# Patient Record
Sex: FEMALE | Race: WHITE | NOT HISPANIC OR LATINO | Employment: UNEMPLOYED | URBAN - METROPOLITAN AREA
[De-identification: names, ages, dates, MRNs, and addresses within clinical notes are randomized per-mention and may not be internally consistent; named-entity substitution may affect disease eponyms.]

---

## 2021-05-26 ENCOUNTER — OFFICE VISIT (OUTPATIENT)
Dept: URGENT CARE | Facility: CLINIC | Age: 21
End: 2021-05-26
Payer: COMMERCIAL

## 2021-05-26 VITALS
HEART RATE: 140 BPM | DIASTOLIC BLOOD PRESSURE: 78 MMHG | TEMPERATURE: 100.5 F | OXYGEN SATURATION: 98 % | SYSTOLIC BLOOD PRESSURE: 122 MMHG | RESPIRATION RATE: 16 BRPM

## 2021-05-26 DIAGNOSIS — R82.90 ABNORMAL URINALYSIS: ICD-10-CM

## 2021-05-26 DIAGNOSIS — R50.9 FEVER, UNSPECIFIED: Primary | ICD-10-CM

## 2021-05-26 DIAGNOSIS — R45.1 AGITATION: ICD-10-CM

## 2021-05-26 LAB
SL AMB  POCT GLUCOSE, UA: NEGATIVE
SL AMB LEUKOCYTE ESTERASE,UA: ABNORMAL
SL AMB POCT BILIRUBIN,UA: NEGATIVE
SL AMB POCT BLOOD,UA: ABNORMAL
SL AMB POCT CLARITY,UA: CLEAR
SL AMB POCT COLOR,UA: YELLOW
SL AMB POCT KETONES,UA: NEGATIVE
SL AMB POCT NITRITE,UA: NEGATIVE
SL AMB POCT PH,UA: 6.5
SL AMB POCT SPECIFIC GRAVITY,UA: 1.01
SL AMB POCT URINE PROTEIN: NEGATIVE
SL AMB POCT UROBILINOGEN: 0.2

## 2021-05-26 PROCEDURE — 99203 OFFICE O/P NEW LOW 30 MIN: CPT | Performed by: PHYSICIAN ASSISTANT

## 2021-05-26 PROCEDURE — 81002 URINALYSIS NONAUTO W/O SCOPE: CPT | Performed by: PHYSICIAN ASSISTANT

## 2021-05-26 PROCEDURE — 87086 URINE CULTURE/COLONY COUNT: CPT | Performed by: PHYSICIAN ASSISTANT

## 2021-05-26 RX ORDER — RISPERIDONE 0.5 MG/1
0.75 TABLET, FILM COATED ORAL DAILY
COMMUNITY
Start: 2021-05-17 | End: 2021-06-14 | Stop reason: ALTCHOICE

## 2021-05-26 RX ORDER — RISPERIDONE 1 MG/1
1 TABLET, FILM COATED ORAL 2 TIMES DAILY
COMMUNITY
Start: 2021-05-17 | End: 2022-04-05 | Stop reason: SDUPTHER

## 2021-05-26 RX ORDER — CIPROFLOXACIN 500 MG/1
500 TABLET, FILM COATED ORAL EVERY 12 HOURS SCHEDULED
Qty: 6 TABLET | Refills: 0 | Status: SHIPPED | OUTPATIENT
Start: 2021-05-26 | End: 2021-05-29

## 2021-05-26 NOTE — PATIENT INSTRUCTIONS
Fever in Adults   WHAT YOU NEED TO KNOW:   A fever is an increase in your body temperature  Normal body temperature is 98 6°F (37°C)  Fever is generally defined as greater than 100 4°F (38°C)  Common causes include an infection, injury, or disease such as arthritis  DISCHARGE INSTRUCTIONS:   Return to the emergency department if:   · Your fever does not go away or gets worse even after treatment  · You have a stiff neck and a bad headache  · You are confused  You may not be able to think clearly or remember things like you normally do  · Your heart beats faster than usual even after treatment  · You have shortness of breath or chest pain when you breathe  · You urinate small amounts or not at all  · Your skin, lips, or nails turn blue  Contact your healthcare provider if:   · You have abdominal pain or you feel bloated  · You have nausea or are vomiting  · You have pain or burning when you urinate, or you have pain in your back  · You have questions or concerns about your condition or care  Medicines: You may need any of the following:  · NSAIDs , such as ibuprofen, help decrease swelling, pain, and fever  This medicine is available with or without a doctor's order  NSAIDs can cause stomach bleeding or kidney problems in certain people  If you take blood thinner medicine, always ask if NSAIDs are safe for you  Always read the medicine label and follow directions  Do not give these medicines to children under 10months of age without direction from your child's healthcare provider  · Acetaminophen  decreases pain and fever  It is available without a doctor's order  Ask how much to take and how often to take it  Follow directions  Read the labels of all other medicines you are using to see if they also contain acetaminophen, or ask your doctor or pharmacist  Acetaminophen can cause liver damage if not taken correctly   Do not use more than 4 grams (4,000 milligrams) total of acetaminophen in one day  · Antibiotics  may be given if you have an infection caused by bacteria  · Take your medicine as directed  Contact your healthcare provider if you think your medicine is not helping or if you have side effects  Tell him of her if you are allergic to any medicine  Keep a list of the medicines, vitamins, and herbs you take  Include the amounts, and when and why you take them  Bring the list or the pill bottles to follow-up visits  Carry your medicine list with you in case of an emergency  Follow up with your healthcare provider as directed:  Write down your questions so you remember to ask them during your visits  Self-care:   · Drink more liquids as directed  A fever makes you sweat  This can increase your risk for dehydration  Liquids can help prevent dehydration  ? Drink at least 6 to 8 eight-ounce cups of clear liquids each day  Drink water, juice, or broth  Do not drink sports drinks  They may contain caffeine  ? Ask your healthcare provider if you should drink an oral rehydration solution (ORS)  An ORS has the right amounts of water, salts, and sugar you need to replace body fluids  · Dress in lightweight clothes  Shivers may be a sign that your fever is rising  Do not put extra blankets or clothes on  This may cause your fever to rise even higher  Dress in light, comfortable clothing  Use a lightweight blanket or sheet when you sleep  Change your clothes, blanket, or sheets if they get wet  · Cool yourself safely  Take a bath in cool or lukewarm water  Use an ice pack wrapped in a small towel or wet a washcloth with cool water  Place the ice pack or wet washcloth on your forehead or the back of your neck  © Copyright [address] Information is for End User's use only and may not be sold, redistributed or otherwise used for commercial purposes   All illustrations and images included in CareNotes® are the copyrighted property of [employer]  or [address]  The above information is an  only  It is not intended as medical advice for individual conditions or treatments  Talk to your doctor, nurse or pharmacist before following any medical regimen to see if it is safe and effective for you

## 2021-05-26 NOTE — PROGRESS NOTES
[facility]        NAME: Cecelia Atkinson is a 19 y o  female  : 2000    MRN: [id number]  DATE:   May 26, 2021  TIME: 3:24 PM    Assessment and Plan   Fever, unspecified [R50 9]  1  Fever, unspecified  POCT urine dip    Urine culture    ciprofloxacin (CIPRO) 500 mg tablet   2  Agitation     3  Abnormal urinalysis  ciprofloxacin (CIPRO) 500 mg tablet         Patient Instructions      discussed condition with patient's mother  The patient is autistic so she is unable to provide a history  She has recent onset of fever and agitation/change in behavior and this tends to be associated with UTI  She has microscopic hematuria and leukocytes in the urine  Will start her on an oral antibiotic and send sample for culture to further evaluate  Recommended rest, increase hydration, close observation  Should be re-evaluated if condition persists or worsens  Follow up with PCP in 3-5 days  Proceed to  ER if symptoms worsen  Chief Complaint     Chief Complaint   Patient presents with    Agitation     Pt's mother reports she is pacing/unsettled x2 days  History of Present Illness         Patient presents with 2 day history of what her mother reports is fever as well as increased agitation/ change in behavior  The patient is autistic so she is unable to vocalize her symptoms  In the past when the patient has behave in this manner, it has often been associated with UTI  She is unsure whether not the patient is having any abdominal pain  Review of Systems   Review of Systems   Constitutional: Positive for fever  Respiratory: Negative  Cardiovascular: Negative  Gastrointestinal: Negative  Genitourinary: Negative  Psychiatric/Behavioral: Positive for agitation           Current Medications       Current Outpatient Medications:     risperiDONE (RisperDAL) 1 mg tablet, Take 1 mg by mouth 2 (two) times a day, Disp: , Rfl:     risperiDONE (RisperDAL) 0 5 mg tablet, Take 0 75 mg by mouth daily, Disp: , Rfl:     Current Allergies     Allergies as of 05/26/2021    (No Known Allergies)            The following portions of the patient's history were reviewed and updated as appropriate: allergies, current medications, past family history, past medical history, past social history, past surgical history and problem list      Past Medical History:   Diagnosis Date    Autism     PCOS (polycystic ovarian syndrome)        History reviewed  No pertinent surgical history  History reviewed  No pertinent family history  Medications have been verified  Objective   /78   Pulse (!) 140   Temp 100 5 °F (38 1 °C)   Resp 16   SpO2 98%   No LMP recorded  Physical Exam     Physical Exam  Vitals signs reviewed  Constitutional:       General: She is not in acute distress  Appearance: She is well-developed  HENT:      Mouth/Throat:      Mouth: Mucous membranes are moist       Pharynx: Oropharynx is clear  Neck:      Musculoskeletal: Neck supple  Cardiovascular:      Rate and Rhythm: Normal rate and regular rhythm  Pulses: Normal pulses  Heart sounds: Normal heart sounds  No murmur  Pulmonary:      Effort: Pulmonary effort is normal  No respiratory distress  Breath sounds: Normal breath sounds  Abdominal:      General: Bowel sounds are normal  There is no distension  Palpations: Abdomen is soft  Tenderness: There is no abdominal tenderness  There is no right CVA tenderness or left CVA tenderness  Lymphadenopathy:      Cervical: No cervical adenopathy  Neurological:      Mental Status: She is alert and oriented to person, place, and time

## 2021-05-28 LAB — BACTERIA UR CULT: ABNORMAL

## 2021-06-14 ENCOUNTER — OFFICE VISIT (OUTPATIENT)
Dept: FAMILY MEDICINE CLINIC | Facility: CLINIC | Age: 21
End: 2021-06-14
Payer: COMMERCIAL

## 2021-06-14 VITALS
WEIGHT: 202.8 LBS | OXYGEN SATURATION: 98 % | TEMPERATURE: 98.5 F | HEIGHT: 62 IN | DIASTOLIC BLOOD PRESSURE: 76 MMHG | HEART RATE: 117 BPM | SYSTOLIC BLOOD PRESSURE: 116 MMHG | RESPIRATION RATE: 16 BRPM | BODY MASS INDEX: 37.32 KG/M2

## 2021-06-14 DIAGNOSIS — Z00.00 ANNUAL PHYSICAL EXAM: Primary | ICD-10-CM

## 2021-06-14 DIAGNOSIS — N39.0 CHRONIC UTI: ICD-10-CM

## 2021-06-14 DIAGNOSIS — F84.0 AUTISTIC DISORDER: ICD-10-CM

## 2021-06-14 DIAGNOSIS — H66.002 NON-RECURRENT ACUTE SUPPURATIVE OTITIS MEDIA OF LEFT EAR WITHOUT SPONTANEOUS RUPTURE OF TYMPANIC MEMBRANE: ICD-10-CM

## 2021-06-14 DIAGNOSIS — E28.2 PCOS (POLYCYSTIC OVARIAN SYNDROME): ICD-10-CM

## 2021-06-14 LAB
SL AMB  POCT GLUCOSE, UA: NEGATIVE
SL AMB LEUKOCYTE ESTERASE,UA: ABNORMAL
SL AMB POCT BILIRUBIN,UA: NEGATIVE
SL AMB POCT BLOOD,UA: ABNORMAL
SL AMB POCT CLARITY,UA: CLEAR
SL AMB POCT COLOR,UA: YELLOW
SL AMB POCT KETONES,UA: NEGATIVE
SL AMB POCT NITRITE,UA: NEGATIVE
SL AMB POCT PH,UA: 6
SL AMB POCT SPECIFIC GRAVITY,UA: 1.01
SL AMB POCT URINE PROTEIN: NEGATIVE
SL AMB POCT UROBILINOGEN: 0.2

## 2021-06-14 PROCEDURE — 3008F BODY MASS INDEX DOCD: CPT | Performed by: NURSE PRACTITIONER

## 2021-06-14 PROCEDURE — 1036F TOBACCO NON-USER: CPT | Performed by: NURSE PRACTITIONER

## 2021-06-14 PROCEDURE — 81002 URINALYSIS NONAUTO W/O SCOPE: CPT | Performed by: NURSE PRACTITIONER

## 2021-06-14 PROCEDURE — 99385 PREV VISIT NEW AGE 18-39: CPT | Performed by: NURSE PRACTITIONER

## 2021-06-14 RX ORDER — AMOXICILLIN 875 MG/1
875 TABLET, COATED ORAL 2 TIMES DAILY
Qty: 14 TABLET | Refills: 0 | Status: SHIPPED | OUTPATIENT
Start: 2021-06-14 | End: 2021-06-21

## 2021-06-14 NOTE — PROGRESS NOTES
[facility]    NAME: Oralia Terrazas  AGE: 19 y o  SEX: female  : 2000     DATE: 2021     Assessment and Plan:     Problem List Items Addressed This Visit        Other    Autistic disorder      Other Visit Diagnoses     Annual physical exam    -  Primary    PCOS (polycystic ovarian syndrome)        Relevant Orders    TSH, 3rd generation    T4, free    T3, free    Chronic UTI        Relevant Medications    amoxicillin (AMOXIL) 875 mg tablet    Other Relevant Orders    CBC and differential    Comprehensive metabolic panel    POCT urine dip (Completed)    Urine culture    Non-recurrent acute suppurative otitis media of left ear without spontaneous rupture of tympanic membrane        Relevant Medications    amoxicillin (AMOXIL) 875 mg tablet          Immunizations and preventive care screenings were discussed with patient today  Appropriate education was printed on patient's after visit summary  Counseling:  Alcohol/drug use: discussed moderation in alcohol intake, the recommendations for healthy alcohol use, and avoidance of illicit drug use  Dental Health: discussed importance of regular tooth brushing, flossing, and dental visits  Injury prevention: discussed safety/seat belts, safety helmets, smoke detectors, carbon dioxide detectors, and smoking near bedding or upholstery  Sexual health: discussed sexually transmitted diseases, partner selection, use of condoms, avoidance of unintended pregnancy, and contraceptive alternatives  · Exercise: the importance of regular exercise/physical activity was discussed  Recommend exercise 3-5 times per week for at least 30 minutes  BMI Counseling: Body mass index is 36 85 kg/m²   The BMI is above normal  Nutrition recommendations include decreasing portion sizes, encouraging healthy choices of fruits and vegetables, decreasing fast food intake, consuming healthier snacks, limiting drinks that contain sugar, moderation in carbohydrate intake, increasing intake of lean protein, reducing intake of saturated and trans fat and reducing intake of cholesterol  Exercise recommendations include moderate physical activity 150 minutes/week and strength training exercises  Discussed with mom since Yoon Melchor is unable to understand this information, mom and dad are the main care givers          Return in 6 months (on 12/14/2021)  Chief Complaint:     Chief Complaint   Patient presents with    [facility]      History of Present Illness:     Adult Annual Physical   Patient here for a comprehensive physical exam  The patient reports problems - pacing with pain  Diet and Physical Activity  · Diet/Nutrition: well balanced diet and consuming 3-5 servings of fruits/vegetables daily  · Exercise: no formal exercise  Depression Screening  PHQ-9 Depression Screening    PHQ-9:   Frequency of the following problems over the past two weeks:           General Health  · Sleep: sleeps well  · Hearing: normal - bilateral   · Vision: no vision problems  · Dental: regular dental visits, brushes teeth twice daily and flosses teeth occasionally  /GYN Health  · Last menstrual period: irregular  · Contraceptive method: not sexually active  · History of STDs?: no      Review of Systems:     Review of Systems   Constitutional: Negative  HENT: Negative  Eyes: Negative  Respiratory: Negative  Cardiovascular: Negative  Gastrointestinal: Negative  Endocrine: Negative  Genitourinary: Negative  Musculoskeletal: Negative  Skin: Negative  Neurological: Negative  Psychiatric/Behavioral: Positive for agitation and behavioral problems (autistic - behaviors )  Past Medical History:     Past Medical History:   Diagnosis Date    Autism     PCOS (polycystic ovarian syndrome)       Past Surgical History:     No past surgical history on file     Social History: Social History     Socioeconomic History    Marital status: Single     Spouse name: None    Number of children: None    Years of education: None    Highest education level: None   Occupational History    None   Tobacco Use    Smoking status: Never Smoker    Smokeless tobacco: Never Used   Vaping Use    Vaping Use: Never used   Substance and Sexual Activity    Alcohol use: Never    Drug use: Never    Sexual activity: Never   Other Topics Concern    None   Social History Narrative    None     Social Determinants of Health     Financial Resource Strain:     Difficulty of Paying Living Expenses:    Food Insecurity:     Worried About Running Out of Food in the Last Year:     Ran Out of Food in the Last Year:    Transportation Needs:     Lack of Transportation (Medical):  Lack of Transportation (Non-Medical):    Physical Activity: Sufficiently Active    Days of Exercise per Week: 7 days    Minutes of Exercise per Session: 30 min   Stress:     Feeling of Stress :    Social Connections:     Frequency of Communication with Friends and Family:     Frequency of Social Gatherings with Friends and Family:     Attends Restoration[employer] Services:     Active Member of Clubs or Organizations:     Attends Club or Organization Meetings:     Marital Status:    Intimate Partner Violence:     Fear of Current or Ex-Partner:     Emotionally Abused:     Physically Abused:     Sexually Abused:       Family History:     No family history on file  Current Medications:     Current Outpatient Medications   Medication Sig Dispense Refill    risperiDONE (RisperDAL) 1 mg tablet Take 1 mg by mouth 2 (two) times a day      amoxicillin (AMOXIL) 875 mg tablet Take 1 tablet (875 mg total) by mouth 2 (two) times a day for 7 days 14 tablet 0     No current facility-administered medications for this visit  Allergies:      Allergies   Allergen Reactions    Sulfa Antibiotics Other (See Comments)     Patient does not tolerate      Physical Exam:     /76 (BP Location: Left arm, Patient Position: Sitting, Cuff Size: Standard)   Pulse (!) 117   Temp 98 5 °F (36 9 °C) (Tympanic)   Resp 16   Ht 5' 2 21" (1 58 m)   Wt 92 kg (202 lb 12 8 oz)   LMP  (LMP Unknown)   SpO2 98%   BMI 36 85 kg/m²     Physical Exam  Vitals and nursing note reviewed  Constitutional:       General: She is not in acute distress  Appearance: She is obese  She is not ill-appearing  HENT:      Head: Normocephalic and atraumatic  Right Ear: Ear canal and external ear normal  No middle ear effusion  There is no impacted cerumen  Tympanic membrane is not erythematous or bulging  Left Ear: Ear canal and external ear normal  A middle ear effusion is present  There is no impacted cerumen  Tympanic membrane is erythematous and bulging  Nose: Nose normal       Mouth/Throat:      Pharynx: Oropharynx is clear  No pharyngeal swelling or posterior oropharyngeal erythema  Tonsils: No tonsillar exudate  Eyes:      General:         Right eye: No discharge  Left eye: No discharge  Extraocular Movements: Extraocular movements intact  Conjunctiva/sclera: Conjunctivae normal    Cardiovascular:      Rate and Rhythm: Normal rate and regular rhythm  Pulses: Normal pulses  Heart sounds: Normal heart sounds  No murmur heard  Pulmonary:      Effort: Pulmonary effort is normal  No respiratory distress  Breath sounds: Normal breath sounds  Abdominal:      General: Bowel sounds are normal       Palpations: Abdomen is soft  Tenderness: There is no abdominal tenderness  There is no right CVA tenderness or left CVA tenderness  Musculoskeletal:         General: No swelling or tenderness  Normal range of motion  Cervical back: Normal range of motion  Right lower leg: No edema  Left lower leg: No edema  Lymphadenopathy:      Cervical: Cervical adenopathy (left cervical chain) present  Skin:     General: Skin is warm and dry  Capillary Refill: Capillary refill takes less than 2 seconds  Neurological:      General: No focal deficit present  Mental Status: She is alert  Comments: Non communicative    Psychiatric:         Attention and Perception: She is inattentive  Mood and Affect: Affect is flat  Speech: She is noncommunicative  Behavior: Behavior is cooperative  Cognition and Memory: Cognition is impaired  Judgment: Judgment is impulsive        Comments: Due to autism unable to assess           [address] [place] [address]

## 2021-06-14 NOTE — PATIENT INSTRUCTIONS

## 2021-06-16 LAB
BACTERIA UR CULT: NORMAL
Lab: NO GROWTH

## 2021-06-24 ENCOUNTER — OFFICE VISIT (OUTPATIENT)
Dept: URGENT CARE | Facility: CLINIC | Age: 21
End: 2021-06-24
Payer: COMMERCIAL

## 2021-06-24 VITALS — RESPIRATION RATE: 16 BRPM | HEART RATE: 120 BPM | TEMPERATURE: 98 F | OXYGEN SATURATION: 99 %

## 2021-06-24 DIAGNOSIS — N39.0 URINARY TRACT INFECTION WITH HEMATURIA, SITE UNSPECIFIED: Primary | ICD-10-CM

## 2021-06-24 DIAGNOSIS — R31.9 URINARY TRACT INFECTION WITH HEMATURIA, SITE UNSPECIFIED: Primary | ICD-10-CM

## 2021-06-24 DIAGNOSIS — R31.9 HEMATURIA, UNSPECIFIED TYPE: ICD-10-CM

## 2021-06-24 LAB
ALBUMIN SERPL-MCNC: 4.7 G/DL (ref 3.9–5)
ALBUMIN/GLOB SERPL: 2.1 {RATIO} (ref 1.2–2.2)
ALP SERPL-CCNC: 68 IU/L (ref 48–121)
ALT SERPL-CCNC: 55 IU/L (ref 0–32)
AST SERPL-CCNC: 31 IU/L (ref 0–40)
BASOPHILS # BLD AUTO: 0.1 X10E3/UL (ref 0–0.2)
BASOPHILS NFR BLD AUTO: 1 %
BILIRUB SERPL-MCNC: 0.8 MG/DL (ref 0–1.2)
BUN SERPL-MCNC: 9 MG/DL (ref 6–20)
BUN/CREAT SERPL: 15 (ref 9–23)
CALCIUM SERPL-MCNC: 9.9 MG/DL (ref 8.7–10.2)
CHLORIDE SERPL-SCNC: 101 MMOL/L (ref 96–106)
CO2 SERPL-SCNC: 20 MMOL/L (ref 20–29)
CREAT SERPL-MCNC: 0.61 MG/DL (ref 0.57–1)
EOSINOPHIL # BLD AUTO: 0.1 X10E3/UL (ref 0–0.4)
EOSINOPHIL NFR BLD AUTO: 1 %
ERYTHROCYTE [DISTWIDTH] IN BLOOD BY AUTOMATED COUNT: 13.7 % (ref 11.7–15.4)
GLOBULIN SER-MCNC: 2.2 G/DL (ref 1.5–4.5)
GLUCOSE SERPL-MCNC: 86 MG/DL (ref 65–99)
HCT VFR BLD AUTO: 42.7 % (ref 34–46.6)
HGB BLD-MCNC: 13.6 G/DL (ref 11.1–15.9)
IMM GRANULOCYTES # BLD: 0 X10E3/UL (ref 0–0.1)
IMM GRANULOCYTES NFR BLD: 0 %
LYMPHOCYTES # BLD AUTO: 4.1 X10E3/UL (ref 0.7–3.1)
LYMPHOCYTES NFR BLD AUTO: 34 %
MCH RBC QN AUTO: 25.4 PG (ref 26.6–33)
MCHC RBC AUTO-ENTMCNC: 31.9 G/DL (ref 31.5–35.7)
MCV RBC AUTO: 80 FL (ref 79–97)
MONOCYTES # BLD AUTO: 1.5 X10E3/UL (ref 0.1–0.9)
MONOCYTES NFR BLD AUTO: 12 %
NEUTROPHILS # BLD AUTO: 6.2 X10E3/UL (ref 1.4–7)
NEUTROPHILS NFR BLD AUTO: 52 %
PLATELET # BLD AUTO: 274 X10E3/UL (ref 150–450)
POTASSIUM SERPL-SCNC: 4.4 MMOL/L (ref 3.5–5.2)
PROT SERPL-MCNC: 6.9 G/DL (ref 6–8.5)
RBC # BLD AUTO: 5.35 X10E6/UL (ref 3.77–5.28)
SL AMB  POCT GLUCOSE, UA: NEGATIVE
SL AMB EGFR AFRICAN AMERICAN: 148 ML/MIN/1.73
SL AMB EGFR NON AFRICAN AMERICAN: 128 ML/MIN/1.73
SL AMB LEUKOCYTE ESTERASE,UA: ABNORMAL
SL AMB POCT BILIRUBIN,UA: NEGATIVE
SL AMB POCT BLOOD,UA: ABNORMAL
SL AMB POCT CLARITY,UA: CLEAR
SL AMB POCT COLOR,UA: YELLOW
SL AMB POCT KETONES,UA: NEGATIVE
SL AMB POCT NITRITE,UA: NEGATIVE
SL AMB POCT PH,UA: 6
SL AMB POCT SPECIFIC GRAVITY,UA: 1.01
SL AMB POCT URINE PROTEIN: NEGATIVE
SL AMB POCT UROBILINOGEN: 0.2
SODIUM SERPL-SCNC: 137 MMOL/L (ref 134–144)
T3FREE SERPL-MCNC: 4 PG/ML (ref 2–4.4)
T4 FREE SERPL-MCNC: 1.31 NG/DL (ref 0.82–1.77)
TSH SERPL DL<=0.005 MIU/L-ACNC: 1.5 UIU/ML (ref 0.45–4.5)
WBC # BLD AUTO: 12.1 X10E3/UL (ref 3.4–10.8)

## 2021-06-24 PROCEDURE — 87086 URINE CULTURE/COLONY COUNT: CPT | Performed by: PHYSICIAN ASSISTANT

## 2021-06-24 PROCEDURE — 99213 OFFICE O/P EST LOW 20 MIN: CPT | Performed by: PHYSICIAN ASSISTANT

## 2021-06-24 PROCEDURE — 81002 URINALYSIS NONAUTO W/O SCOPE: CPT | Performed by: PHYSICIAN ASSISTANT

## 2021-06-24 RX ORDER — CEPHALEXIN 500 MG/1
500 CAPSULE ORAL EVERY 12 HOURS SCHEDULED
Qty: 14 CAPSULE | Refills: 0 | Status: SHIPPED | OUTPATIENT
Start: 2021-06-24 | End: 2021-07-01

## 2021-06-24 NOTE — PATIENT INSTRUCTIONS
Urinary Tract Infection in Women   WHAT YOU NEED TO KNOW:   A urinary tract infection (UTI) is caused by bacteria that get inside your urinary tract  Most bacteria that enter your urinary tract come out when you urinate  If the bacteria stay in your urinary tract, you may get an infection  Your urinary tract includes your kidneys, ureters, bladder, and urethra  Urine is made in your kidneys, and it flows from the ureters to the bladder  Urine leaves the bladder through the urethra  A UTI is more common in your lower urinary tract, which includes your bladder and urethra  DISCHARGE INSTRUCTIONS:   Return to the emergency department if:   · You are urinating very little or not at all  · You have a high fever with shaking chills  · You have side or back pain that gets worse  Call your doctor if:   · You have a fever  · You do not feel better after 2 days of taking antibiotics  · You are vomiting  · You have questions or concerns about your condition or care  Medicines:   · Antibiotics  help fight a bacterial infection  If you have UTIs often (called recurrent UTIs), you may be given antibiotics to take regularly  You will be given directions for when and how to use antibiotics  The goal is to prevent UTIs but not cause antibiotic resistance by using antibiotics too often  · Medicines  may be given to decrease pain and burning when you urinate  They will also help decrease the feeling that you need to urinate often  These medicines will make your urine orange or red  · Take your medicine as directed  Contact your healthcare provider if you think your medicine is not helping or if you have side effects  Tell him or her if you are allergic to any medicine  Keep a list of the medicines, vitamins, and herbs you take  Include the amounts, and when and why you take them  Bring the list or the pill bottles to follow-up visits   Carry your medicine list with you in case of an emergency  Follow up with your healthcare provider as directed:  Write down your questions so you remember to ask them during your visits  Prevent another UTI:   · Empty your bladder often  Urinate and empty your bladder as soon as you feel the need  Do not hold your urine for long periods of time  · Wipe from front to back after you urinate or have a bowel movement  This will help prevent germs from getting into your urinary tract through your urethra  · Drink liquids as directed  Ask how much liquid to drink each day and which liquids are best for you  You may need to drink more liquids than usual to help flush out the bacteria  Do not drink alcohol, caffeine, or citrus juices  These can irritate your bladder and increase your symptoms  Your healthcare provider may recommend cranberry juice to help prevent a UTI  · Urinate after you have sex  This can help flush out bacteria passed during sex  · Do not douche or use feminine deodorants  These can change the chemical balance in your vagina  · Change sanitary pads or tampons often  This will help prevent germs from getting into your urinary tract  · Talk to your healthcare provider about your birth control method  You may need to change your method if it is increasing your risk for UTIs  · Wear cotton underwear and clothes that are loose  Tight pants and nylon underwear can trap moisture and cause bacteria to grow  · Vaginal estrogen may be recommended  This medicine helps prevent UTIs in women who have gone through menopause or are in otto-menopause  · Do pelvic muscle exercises often  Pelvic muscle exercises may help you start and stop urinating  Strong pelvic muscles may help you empty your bladder easier  Squeeze these muscles tightly for 5 seconds like you are trying to hold back urine  Then relax for 5 seconds  Gradually work up to squeezing for 10 seconds  Do 3 sets of 15 repetitions a day, or as directed      © Copyright [address] Information is for [employer] use only and may not be sold, redistributed or otherwise used for commercial purposes  All illustrations and images included in CareNotes® are the copyrighted property of [employer]  or [address]  The above information is an  only  It is not intended as medical advice for individual conditions or treatments  Talk to your doctor, nurse or pharmacist before following any medical regimen to see if it is safe and effective for you

## 2021-06-25 ENCOUNTER — LAB REQUISITION (OUTPATIENT)
Dept: LAB | Facility: HOSPITAL | Age: 21
End: 2021-06-25
Payer: COMMERCIAL

## 2021-06-25 DIAGNOSIS — R31.9 HEMATURIA, UNSPECIFIED: ICD-10-CM

## 2021-06-26 LAB — BACTERIA UR CULT: NORMAL

## 2021-07-13 NOTE — PROGRESS NOTES
[facility]        NAME: Marly Pfeiffer is a 19 y o  female  : 2000    MRN: [id number]  DATE: 2021  TIME: 6:54 AM    Assessment and Plan   Urinary tract infection with hematuria, site unspecified [N39 0, R31 9]  1  Urinary tract infection with hematuria, site unspecified  cephalexin (KEFLEX) 500 mg capsule   2  Hematuria, unspecified type  POCT urine dip    Urine culture         Patient Instructions       Follow up with PCP in 3-5 days  Proceed to  ER if symptoms worsen  Chief Complaint     Chief Complaint   Patient presents with    Pain     pt's father reports she has been "in pain" and treated for an ear infection recently         History of Present Illness        20-year-old autistic female who is mainly nonverbal presents to the clinic with her father for increased fussiness  Father states that patient cannot verbalize any specific symptom but states that in the past she has had UTIs when she has similar symptoms  Father denies fevers, chills and states that she was recently treated for an ear infection but father notes that she has recently been "in pain "  Review of Systems   Review of Systems   Unable to perform ROS: Other (Autistic, nonverbal)   Constitutional: Negative for chills and fever  Gastrointestinal: Negative for constipation, diarrhea, nausea and vomiting  Skin: Negative for rash  Psychiatric/Behavioral: Positive for agitation  All other systems reviewed and are negative          Current Medications       Current Outpatient Medications:     risperiDONE (RisperDAL) 1 mg tablet, Take 1 mg by mouth 2 (two) times a day, Disp: , Rfl:     Current Allergies     Allergies as of 2021 - Reviewed 2021   Allergen Reaction Noted    Sulfa antibiotics Other (See Comments) 2021            The following portions of the patient's history were reviewed and updated as appropriate: allergies, current medications, past family history, past medical history, past social history, past surgical history and problem list      Past Medical History:   Diagnosis Date    Autism     PCOS (polycystic ovarian syndrome)        History reviewed  No pertinent surgical history  History reviewed  No pertinent family history  Medications have been verified  Objective   Pulse (!) 120   Temp 98 °F (36 7 °C)   Resp 16   LMP  (LMP Unknown)   SpO2 99%   No LMP recorded (lmp unknown)  Physical Exam     Physical Exam  Vitals and nursing note reviewed  Constitutional:       General: She is not in acute distress  Appearance: She is well-developed  She is not diaphoretic  Pulmonary:      Effort: Pulmonary effort is normal    Abdominal:      General: Bowel sounds are normal       Palpations: Abdomen is soft  Tenderness: There is abdominal tenderness in the suprapubic area  There is no right CVA tenderness, left CVA tenderness or guarding  Comments:  Patient verbalized tenderness to palpation of lower abdomen on exam and UA showed trace leukocytes and trace blood   Skin:     General: Skin is warm and dry  Neurological:      Mental Status: She is alert and oriented to person, place, and time

## 2021-07-23 ENCOUNTER — OFFICE VISIT (OUTPATIENT)
Dept: FAMILY MEDICINE CLINIC | Facility: CLINIC | Age: 21
End: 2021-07-23
Payer: COMMERCIAL

## 2021-07-23 VITALS
HEART RATE: 93 BPM | DIASTOLIC BLOOD PRESSURE: 84 MMHG | BODY MASS INDEX: 37.61 KG/M2 | WEIGHT: 204.4 LBS | TEMPERATURE: 98.5 F | HEIGHT: 62 IN | RESPIRATION RATE: 16 BRPM | OXYGEN SATURATION: 98 % | SYSTOLIC BLOOD PRESSURE: 130 MMHG

## 2021-07-23 DIAGNOSIS — R45.4 IRRITABILITY: ICD-10-CM

## 2021-07-23 DIAGNOSIS — F84.0 AUTISTIC DISORDER: Primary | ICD-10-CM

## 2021-07-23 PROCEDURE — 3008F BODY MASS INDEX DOCD: CPT | Performed by: FAMILY MEDICINE

## 2021-07-23 PROCEDURE — 99214 OFFICE O/P EST MOD 30 MIN: CPT | Performed by: FAMILY MEDICINE

## 2021-07-23 PROCEDURE — 3725F SCREEN DEPRESSION PERFORMED: CPT | Performed by: FAMILY MEDICINE

## 2021-07-23 RX ORDER — RISPERIDONE 0.5 MG/1
0.5 TABLET, FILM COATED ORAL 2 TIMES DAILY
COMMUNITY
End: 2021-10-15 | Stop reason: SDUPTHER

## 2021-08-27 ENCOUNTER — OFFICE VISIT (OUTPATIENT)
Dept: URGENT CARE | Facility: CLINIC | Age: 21
End: 2021-08-27
Payer: COMMERCIAL

## 2021-08-27 VITALS
TEMPERATURE: 98.6 F | BODY MASS INDEX: 37.54 KG/M2 | HEART RATE: 128 BPM | HEIGHT: 62 IN | WEIGHT: 204 LBS | OXYGEN SATURATION: 99 % | RESPIRATION RATE: 16 BRPM

## 2021-08-27 DIAGNOSIS — H60.503 ACUTE OTITIS EXTERNA OF BOTH EARS, UNSPECIFIED TYPE: Primary | ICD-10-CM

## 2021-08-27 PROCEDURE — 99213 OFFICE O/P EST LOW 20 MIN: CPT | Performed by: PREVENTIVE MEDICINE

## 2021-08-27 RX ORDER — CIPROFLOXACIN AND DEXAMETHASONE 3; 1 MG/ML; MG/ML
4 SUSPENSION/ DROPS AURICULAR (OTIC) 2 TIMES DAILY
Qty: 7.5 ML | Refills: 0 | Status: SHIPPED | OUTPATIENT
Start: 2021-08-27

## 2021-08-27 NOTE — PROGRESS NOTES
[facility]        NAME: Geoff Schneider is a 19 y o  female  : 2000    MRN: [id number]  DATE: 2021  TIME: 7:15 PM    Assessment and Plan   Acute otitis externa of both ears, unspecified type [H60 503]  1  Acute otitis externa of both ears, unspecified type  ciprofloxacin-dexamethasone (CIPRODEX) otic suspension         Patient Instructions       Follow up with PCP in 3-5 days  Proceed to  ER if symptoms worsen  Chief Complaint     Chief Complaint   Patient presents with   Ele Hanley     pt's mother reports the pt has b/l ear redness         History of Present Illness        Bilateral ear pain x2 or 3 days  Mother says that the child does Dr       Review of Systems   Review of Systems   HENT: Positive for ear pain  Current Medications       Current Outpatient Medications:     risperiDONE (RisperDAL) 0 5 mg tablet, Take 0 5 mg by mouth 2 (two) times a day, Disp: , Rfl:     risperiDONE (RisperDAL) 1 mg tablet, Take 1 mg by mouth 2 (two) times a day, Disp: , Rfl:     ciprofloxacin-dexamethasone (CIPRODEX) otic suspension, Administer 4 drops into both ears 2 (two) times a day, Disp: 7 5 mL, Rfl: 0    Current Allergies     Allergies as of 2021 - Reviewed 2021   Allergen Reaction Noted    Sulfa antibiotics Other (See Comments) 2021            The following portions of the patient's history were reviewed and updated as appropriate: allergies, current medications, past family history, past medical history, past social history, past surgical history and problem list      Past Medical History:   Diagnosis Date    Autism     PCOS (polycystic ovarian syndrome)        No past surgical history on file  No family history on file  Medications have been verified  Objective   Pulse (!) 128   Temp 98 6 °F (37 °C)   Resp 16   Ht 5' 2" (1 575 m)   Wt 92 5 kg (204 lb)   SpO2 99%   BMI 37 31 kg/m²   No LMP recorded         Physical Exam     Physical Exam  HENT:      Ears:      Comments: Both external auditory canals are very minimally swollen and very minimally erythema  The drums are not inflamed or infected

## 2021-09-20 ENCOUNTER — OFFICE VISIT (OUTPATIENT)
Dept: FAMILY MEDICINE CLINIC | Facility: CLINIC | Age: 21
End: 2021-09-20
Payer: COMMERCIAL

## 2021-09-20 VITALS
TEMPERATURE: 98.7 F | BODY MASS INDEX: 38.42 KG/M2 | OXYGEN SATURATION: 98 % | HEART RATE: 110 BPM | DIASTOLIC BLOOD PRESSURE: 84 MMHG | WEIGHT: 208.8 LBS | HEIGHT: 62 IN | SYSTOLIC BLOOD PRESSURE: 124 MMHG

## 2021-09-20 DIAGNOSIS — F84.0 AUTISM: Primary | ICD-10-CM

## 2021-09-20 PROCEDURE — 99214 OFFICE O/P EST MOD 30 MIN: CPT | Performed by: NURSE PRACTITIONER

## 2021-09-20 PROCEDURE — 3008F BODY MASS INDEX DOCD: CPT | Performed by: NURSE PRACTITIONER

## 2021-09-20 PROCEDURE — 1036F TOBACCO NON-USER: CPT | Performed by: NURSE PRACTITIONER

## 2021-09-20 NOTE — PROGRESS NOTES
Assessment/Plan   Problem List Items Addressed This Visit     None      Visit Diagnoses     Autism    -  Primary    Relevant Orders    CBC and differential    Hemoglobin A1C    Comprehensive metabolic panel    UA (URINE) with reflex to Scope                No chief complaint on file  Subjective   Patient ID: Bridget Garcia is a 19 y o  female  Vitals:    09/20/21 1656   BP: 124/84   Pulse: (!) 110   Temp: 98 7 °F (37 1 °C)   SpO2: 98%     History of Pcos, anxiety,and ibs  Was started on risperidal for acting out episodes, Mom requesting medical check to ensure no physical reason for increased irritability   Currently involved in Agitation intensive program - home and community based   Neurologist appointment - Adaline Lamp for medication - has been on several in past Risperdal is the only medication that helps  Will run additional labs and increase risperidal to a total of 3 mg PO daily - 1/5 mg BID and await advice of neurologist      The following portions of the patient's history were reviewed and updated as appropriate: allergies, current medications, past family history, past social history, past surgical history and problem list     Review of Systems   Constitutional: Negative  HENT: Negative  Eyes: Negative  Respiratory: Negative  Cardiovascular: Negative  Gastrointestinal: Negative  Endocrine: Negative  Genitourinary: Negative  Musculoskeletal: Negative  Skin: Negative  Allergic/Immunologic: Negative  Neurological: Negative  Hematological: Negative  Psychiatric/Behavioral: Positive for behavioral problems  The patient is nervous/anxious  Objective     Physical Exam  Vitals and nursing note reviewed  Constitutional:       Appearance: She is obese  She is not ill-appearing  HENT:      Head: Normocephalic and atraumatic  Right Ear: Tympanic membrane, ear canal and external ear normal  There is no impacted cerumen        Left Ear: Tympanic membrane and external ear normal  There is no impacted cerumen  Ears:      Comments: Left ear canal with trace swelling and redness     Nose: Nose normal  No congestion  Mouth/Throat:      Mouth: Mucous membranes are moist       Pharynx: Oropharynx is clear  No oropharyngeal exudate or posterior oropharyngeal erythema  Eyes:      General:         Right eye: No discharge  Left eye: No discharge  Extraocular Movements: Extraocular movements intact  Conjunctiva/sclera: Conjunctivae normal       Pupils: Pupils are equal, round, and reactive to light  Cardiovascular:      Rate and Rhythm: Normal rate and regular rhythm  Pulses: Normal pulses  Heart sounds: Normal heart sounds  No murmur heard  Pulmonary:      Effort: Pulmonary effort is normal  No respiratory distress  Breath sounds: Normal breath sounds  No wheezing  Abdominal:      General: Abdomen is flat  Bowel sounds are normal  There is no distension  Palpations: Abdomen is soft  Tenderness: There is no abdominal tenderness  There is no right CVA tenderness or left CVA tenderness  Musculoskeletal:         General: Normal range of motion  Cervical back: Normal range of motion  Skin:     General: Skin is warm and dry  Capillary Refill: Capillary refill takes less than 2 seconds  Neurological:      Mental Status: She is alert  Mental status is at baseline  Psychiatric:         Attention and Perception: She is inattentive  Mood and Affect: Mood is anxious  Affect is flat  Speech: She is noncommunicative  Behavior: Behavior is hyperactive  Behavior is cooperative  Judgment: Judgment is impulsive and inappropriate  Comments:  Will repeat certain phrases and will connect with mom and dad in conversation       Allergies   Allergen Reactions    Sulfa Antibiotics Other (See Comments)     Patient does not tolerate

## 2021-09-20 NOTE — LETTER
September 20, 2021     Patient: Marly Pfeiffer   YOB: 2000   Date of Visit: 9/20/2021       To Whom it May Concern:    Marly Pfeiffer is under my professional care  She was seen in my office on 9/20/2021  She needs to have a healthy heart diet with limited sugar salt and fat  Also she should have a moderate exercise daily - walking, swimming, or bike at least 30 minutes, 5 days out of 7 days of the week  If you have any questions or concerns, please don't hesitate to call           Sincerely,          SANTANA Rich        CC: No Recipients

## 2021-10-13 LAB
ALBUMIN SERPL-MCNC: 4.5 G/DL (ref 3.9–5)
ALBUMIN/GLOB SERPL: 1.9 {RATIO} (ref 1.2–2.2)
ALP SERPL-CCNC: 72 IU/L (ref 44–121)
ALT SERPL-CCNC: 56 IU/L (ref 0–32)
APPEARANCE UR: CLEAR
AST SERPL-CCNC: 35 IU/L (ref 0–40)
BACTERIA URNS QL MICRO: ABNORMAL
BASOPHILS # BLD AUTO: 0.1 X10E3/UL (ref 0–0.2)
BASOPHILS NFR BLD AUTO: 1 %
BILIRUB SERPL-MCNC: 0.8 MG/DL (ref 0–1.2)
BILIRUB UR QL STRIP: NEGATIVE
BUN SERPL-MCNC: 10 MG/DL (ref 6–20)
BUN/CREAT SERPL: 16 (ref 9–23)
CALCIUM SERPL-MCNC: 9.7 MG/DL (ref 8.7–10.2)
CASTS URNS QL MICRO: ABNORMAL /LPF
CHLORIDE SERPL-SCNC: 102 MMOL/L (ref 96–106)
CO2 SERPL-SCNC: 21 MMOL/L (ref 20–29)
COLOR UR: YELLOW
CREAT SERPL-MCNC: 0.61 MG/DL (ref 0.57–1)
EOSINOPHIL # BLD AUTO: 0.1 X10E3/UL (ref 0–0.4)
EOSINOPHIL NFR BLD AUTO: 1 %
EPI CELLS #/AREA URNS HPF: ABNORMAL /HPF (ref 0–10)
ERYTHROCYTE [DISTWIDTH] IN BLOOD BY AUTOMATED COUNT: 13.7 % (ref 11.7–15.4)
EST. AVERAGE GLUCOSE BLD GHB EST-MCNC: 108 MG/DL
GLOBULIN SER-MCNC: 2.4 G/DL (ref 1.5–4.5)
GLUCOSE SERPL-MCNC: 87 MG/DL (ref 65–99)
GLUCOSE UR QL: NEGATIVE
HBA1C MFR BLD: 5.4 % (ref 4.8–5.6)
HCT VFR BLD AUTO: 38.6 % (ref 34–46.6)
HGB BLD-MCNC: 13.1 G/DL (ref 11.1–15.9)
HGB UR QL STRIP: NEGATIVE
IMM GRANULOCYTES # BLD: 0.1 X10E3/UL (ref 0–0.1)
IMM GRANULOCYTES NFR BLD: 1 %
KETONES UR QL STRIP: NEGATIVE
LEUKOCYTE ESTERASE UR QL STRIP: ABNORMAL
LYMPHOCYTES # BLD AUTO: 4.6 X10E3/UL (ref 0.7–3.1)
LYMPHOCYTES NFR BLD AUTO: 37 %
MCH RBC QN AUTO: 26.3 PG (ref 26.6–33)
MCHC RBC AUTO-ENTMCNC: 33.9 G/DL (ref 31.5–35.7)
MCV RBC AUTO: 77 FL (ref 79–97)
MICRO URNS: ABNORMAL
MONOCYTES # BLD AUTO: 1.4 X10E3/UL (ref 0.1–0.9)
MONOCYTES NFR BLD AUTO: 11 %
NEUTROPHILS # BLD AUTO: 6.2 X10E3/UL (ref 1.4–7)
NEUTROPHILS NFR BLD AUTO: 49 %
NITRITE UR QL STRIP: NEGATIVE
PH UR STRIP: 6.5 [PH] (ref 5–7.5)
PLATELET # BLD AUTO: 256 X10E3/UL (ref 150–450)
POTASSIUM SERPL-SCNC: 4.6 MMOL/L (ref 3.5–5.2)
PROT SERPL-MCNC: 6.9 G/DL (ref 6–8.5)
PROT UR QL STRIP: NEGATIVE
RBC # BLD AUTO: 4.99 X10E6/UL (ref 3.77–5.28)
RBC #/AREA URNS HPF: ABNORMAL /HPF (ref 0–2)
SL AMB EGFR AFRICAN AMERICAN: 148 ML/MIN/1.73
SL AMB EGFR NON AFRICAN AMERICAN: 128 ML/MIN/1.73
SODIUM SERPL-SCNC: 136 MMOL/L (ref 134–144)
SP GR UR: 1.02 (ref 1–1.03)
UROBILINOGEN UR STRIP-ACNC: 0.2 MG/DL (ref 0.2–1)
WBC # BLD AUTO: 12.4 X10E3/UL (ref 3.4–10.8)
WBC #/AREA URNS HPF: ABNORMAL /HPF (ref 0–5)

## 2021-10-15 DIAGNOSIS — D50.9 IRON DEFICIENCY ANEMIA, UNSPECIFIED IRON DEFICIENCY ANEMIA TYPE: Primary | ICD-10-CM

## 2021-10-15 DIAGNOSIS — R45.4 IRRITABILITY: Primary | ICD-10-CM

## 2021-10-15 RX ORDER — RISPERIDONE 0.5 MG/1
0.5 TABLET, FILM COATED ORAL 2 TIMES DAILY
Qty: 90 TABLET | Refills: 1 | Status: SHIPPED | OUTPATIENT
Start: 2021-10-15 | End: 2022-04-05 | Stop reason: SDUPTHER

## 2022-02-18 ENCOUNTER — PATIENT MESSAGE (OUTPATIENT)
Dept: FAMILY MEDICINE CLINIC | Facility: CLINIC | Age: 22
End: 2022-02-18

## 2022-02-18 DIAGNOSIS — D50.9 IRON DEFICIENCY ANEMIA, UNSPECIFIED IRON DEFICIENCY ANEMIA TYPE: Primary | ICD-10-CM

## 2022-03-11 LAB
BASOPHILS # BLD AUTO: 0.1 X10E3/UL (ref 0–0.2)
BASOPHILS NFR BLD AUTO: 1 %
EOSINOPHIL # BLD AUTO: 0.1 X10E3/UL (ref 0–0.4)
EOSINOPHIL NFR BLD AUTO: 1 %
ERYTHROCYTE [DISTWIDTH] IN BLOOD BY AUTOMATED COUNT: 14.2 % (ref 11.7–15.4)
FERRITIN SERPL-MCNC: 496 NG/ML (ref 15–150)
HCT VFR BLD AUTO: 40.6 % (ref 34–46.6)
HGB BLD-MCNC: 13.8 G/DL (ref 11.1–15.9)
IMM GRANULOCYTES # BLD: 0.1 X10E3/UL (ref 0–0.1)
IMM GRANULOCYTES NFR BLD: 1 %
IRON SATN MFR SERPL: 34 % (ref 15–55)
IRON SERPL-MCNC: 100 UG/DL (ref 27–159)
LYMPHOCYTES # BLD AUTO: 4.5 X10E3/UL (ref 0.7–3.1)
LYMPHOCYTES NFR BLD AUTO: 38 %
MCH RBC QN AUTO: 26.4 PG (ref 26.6–33)
MCHC RBC AUTO-ENTMCNC: 34 G/DL (ref 31.5–35.7)
MCV RBC AUTO: 78 FL (ref 79–97)
MONOCYTES # BLD AUTO: 1.1 X10E3/UL (ref 0.1–0.9)
MONOCYTES NFR BLD AUTO: 9 %
NEUTROPHILS # BLD AUTO: 6 X10E3/UL (ref 1.4–7)
NEUTROPHILS NFR BLD AUTO: 50 %
PLATELET # BLD AUTO: 276 X10E3/UL (ref 150–450)
RBC # BLD AUTO: 5.23 X10E6/UL (ref 3.77–5.28)
TIBC SERPL-MCNC: 294 UG/DL (ref 250–450)
UIBC SERPL-MCNC: 194 UG/DL (ref 131–425)
WBC # BLD AUTO: 11.8 X10E3/UL (ref 3.4–10.8)

## 2022-03-14 DIAGNOSIS — D72.829 LEUKOCYTOSIS, UNSPECIFIED TYPE: Primary | ICD-10-CM

## 2022-03-14 DIAGNOSIS — D50.9 IRON DEFICIENCY ANEMIA, UNSPECIFIED IRON DEFICIENCY ANEMIA TYPE: Primary | ICD-10-CM

## 2022-03-15 ENCOUNTER — TELEPHONE (OUTPATIENT)
Dept: HEMATOLOGY ONCOLOGY | Facility: CLINIC | Age: 22
End: 2022-03-15

## 2022-03-15 NOTE — TELEPHONE ENCOUNTER
New Patient Intake Form   Patient Details:    Crystal Montoya  2000  [id number]    Appointment Information   Who is calling to schedule? Mother   If not self, what is the caller's name? Please put name of RBC nurse as well  Olu Mayo   Referring provider SANTANA Romero   What is the diagnosis? Iron deficiency anemia, unspecified iron deficiency anemia type  Leukocytosis, unspecified type     Is there a confirmed tissue diagnosis? No   Is there a biopsy ordered or pending? Please specify dates        Is patient aware of diagnosis? Yes   Have you had any imaging or labs done? If yes, where? (If imaging done outside of [facility], please remind patient to bring a disk ) Yes   3/10/22       If imaging done at outside facility, did you instruct patient to obtain discs and bring to visit? Have you been seen by another Oncologist/Hematologist?  If so, who and where? No   Are the records in [facility] or Care Everywhere? Yes   Does the patient have records at another facility/hospital? No   If yes, Name of facility, city and state where facility is located  Did you instruct patient to have records faxed to [facility] and provide rightfax number? Preferred [place]   Is the patient willing to be seen by another provider?   (This is for breast patients only)    Miscellaneous Information:    Patient has special needs and mother is requesting a call back at [phone]  Appointment made for 4/1/22 at 2 with Arlene Ly in [facility]

## 2022-03-24 ENCOUNTER — TELEPHONE (OUTPATIENT)
Dept: HEMATOLOGY ONCOLOGY | Facility: CLINIC | Age: 22
End: 2022-03-24

## 2022-03-24 NOTE — TELEPHONE ENCOUNTER
Returned telephone call spoke with Pt's Mom  Pt has severe autism and Mom wants to prepare Pt for the visit  Both Mom and Dad will drive to the appointment  Explained the MA rooms the pt and get V/S, Jessica Najera will assess pt - typically listens to heart and lungs, reviews labs and any symptoms  Mom is requesting that pt will leave after the physical assessment with Dad and Jessica Najera can then discuss with the Mom dx or any plan for treatments

## 2022-03-24 NOTE — TELEPHONE ENCOUNTER
I called the patient & spoke with mom in regards to patient who is a special needs patient, I informed mom that provider will be out of the office on 4/01 and I then stated patient's appointment was moved to 04/05 @ 1 pm  Mom voiced agreement  Mom would like to talk to someone in regards to the appointment to make accommodations due to special needs of the patient  She would like to discuss the appointment with someone and needs a call back  Please advice

## 2022-03-30 ENCOUNTER — TELEPHONE (OUTPATIENT)
Dept: HEMATOLOGY ONCOLOGY | Facility: CLINIC | Age: 22
End: 2022-03-30

## 2022-03-30 NOTE — TELEPHONE ENCOUNTER
Confirmed appointment for daughter Julia Rose with [facility] on 4/5/22  Mother stated that patient is special needs and that she and patient's father will be accompanying patient  I notated this in the appointment note so check in is aware  Reviewed policies:    "We ask that you come at least 15 minutes early for your appointment to complete all paperwork  However, If you are up to 20 minutes late for your appointment, we may need to reschedule you  Any lateness to an appointment may result in an shorted visit, for our providers to offer you the most out of your consult, please arrive early  We require at least 24-hour notice for cancelations and if you miss your appointment 3 times, we may unfortunately not be able to reschedule any future visits  We ask that you please call our office in the event you are feeling ill as we may need to reschedule your appointment  You are allowed to bring only one visitor with you to your appointment, if your visitor is not feeling well we ask that you don't bring them   Our current masking policy is: if you are vaccinated masking is optional, if you are unvaccinated masking is required  "

## 2022-04-05 ENCOUNTER — CONSULT (OUTPATIENT)
Dept: HEMATOLOGY ONCOLOGY | Facility: CLINIC | Age: 22
End: 2022-04-05
Payer: COMMERCIAL

## 2022-04-05 VITALS
TEMPERATURE: 96.5 F | HEART RATE: 96 BPM | RESPIRATION RATE: 18 BRPM | OXYGEN SATURATION: 98 % | HEIGHT: 62 IN | WEIGHT: 218 LBS | SYSTOLIC BLOOD PRESSURE: 108 MMHG | DIASTOLIC BLOOD PRESSURE: 64 MMHG | BODY MASS INDEX: 40.12 KG/M2

## 2022-04-05 DIAGNOSIS — R71.8 MICROCYTOSIS: Primary | ICD-10-CM

## 2022-04-05 DIAGNOSIS — D72.829 LEUKOCYTOSIS, UNSPECIFIED TYPE: ICD-10-CM

## 2022-04-05 DIAGNOSIS — F84.0 AUTISM: Primary | ICD-10-CM

## 2022-04-05 DIAGNOSIS — R45.4 IRRITABILITY: ICD-10-CM

## 2022-04-05 PROCEDURE — 3008F BODY MASS INDEX DOCD: CPT | Performed by: PHYSICIAN ASSISTANT

## 2022-04-05 PROCEDURE — 99204 OFFICE O/P NEW MOD 45 MIN: CPT | Performed by: PHYSICIAN ASSISTANT

## 2022-04-05 PROCEDURE — 1036F TOBACCO NON-USER: CPT | Performed by: PHYSICIAN ASSISTANT

## 2022-04-05 RX ORDER — RISPERIDONE 0.5 MG/1
0.5 TABLET, FILM COATED ORAL 2 TIMES DAILY
Qty: 90 TABLET | Refills: 1 | Status: SHIPPED | OUTPATIENT
Start: 2022-04-05

## 2022-04-05 RX ORDER — RISPERIDONE 1 MG/1
1 TABLET, FILM COATED ORAL 2 TIMES DAILY
Qty: 90 TABLET | Refills: 1 | Status: SHIPPED | OUTPATIENT
Start: 2022-04-05

## 2022-04-05 NOTE — PROGRESS NOTES
Hematology/Oncology Outpatient Consult  Valarie Hashimoto 19 y o  female 2000 [id number]    Date:  4/5/2022      Assessment and Plan:  1  Leukocytosis  Patient has chronic mild leukocytosis with mildly elevated lymphocytes and monocytes  Patient's mother states that on historical blood work patient always has mild elevation WBC  I also note in 2019 at [facility] this finding was also consistent with her most recent readings  She states that this other records were not yet uploaded to the system but she will try to upload to my chart  Reviewed this may be a statistical artifact but we certainly can look into this further  Her mother would like 2 further  This is appropriate  She will have flow cytometry for evaluation of possible precursor to CLL  - Leukemia/Lymphoma flow cytometry; Future  - Leukemia/Lymphoma flow cytometry    2  Microcytosis  Patient has had microcytosis without anemia  This likely could be consistent with thalassemia  Will complete hemoglobin electrophoresis and alpha-thalassemia blood test   She has was performed at [facility]  She is advised to call the office if they have confusion regarding which blood test they should be completing  She is in agreement  - Thalassemia and Hemoglobinopathy Comp; Future  - MISCELLANEOUS LAB TEST; Future  - MISCELLANEOUS LAB TEST    Results to be reviewed in approximately 1 month over the phone in virtual visit  HPI:  61-year-old female presents for consultation regarding elevated  WBC and low MCV  She is accompanied by her mother and father  Patient has autism and is minimally verbal       Her mother provided the history and a details regarding review of systems  Review of systems her mother states is limited due to her communication  Her mother states that the patient has not had any new complaints at this time  She is moving her bowels adequately, at least once a day  Urination is normal thought complaints    Patient has PCOS and has had menstrual bleeding only approximately a few times in her life  She has never had a gyn exam   She was diagnosed with PCOS based on abdominal imaging by PCP  This was when patient was living in the [facility] area  No changes in her energy level  She has chronic ear infections but without any recent complaints      Past Medical History:   Diagnosis Date    Autism     IBS (irritable colon syndrome)     PCOS (polycystic ovarian syndrome)        Past Surgical History:   Procedure Laterality Date    TYMPANOSTOMY TUBE PLACEMENT         Social History     Socioeconomic History    Marital status: Single     Spouse name: None    Number of children: None    Years of education: None    Highest education level: None   Occupational History    None   Tobacco Use    Smoking status: Never Smoker    Smokeless tobacco: Never Used   Vaping Use    Vaping Use: Never used   Substance and Sexual Activity    Alcohol use: Never    Drug use: Never    Sexual activity: Never   Other Topics Concern    None   Social History Narrative    None     Social Determinants of Health     Financial Resource Strain: Not on file   Food Insecurity: Not on file   Transportation Needs: Not on file   Physical Activity: Sufficiently Active    Days of Exercise per Week: 7 days    Minutes of Exercise per Session: 30 min   Stress: No Stress Concern Present    Feeling of Stress : Not at all   Social Connections: Not on file   Intimate Partner Violence: Not At Risk    Fear of Current or Ex-Partner: No    Emotionally Abused: No    Physically Abused: No    Sexually Abused: No   Housing Stability: Not on file       Family History   Problem Relation Age of Onset    Prostate cancer Maternal Grandfather        Allergies   Allergen Reactions    Sulfa Antibiotics Other (See Comments)     Patient does not tolerate         Current Outpatient Medications:     ciprofloxacin-dexamethasone (CIPRODEX) otic suspension, Administer 4 drops into both ears 2 (two) times a day, Disp: 7 5 mL, Rfl: 0    risperiDONE (RisperDAL) 0 5 mg tablet, Take 1 tablet (0 5 mg total) by mouth 2 (two) times a day, Disp: 90 tablet, Rfl: 1    risperiDONE (RisperDAL) 1 mg tablet, Take 1 tablet (1 mg total) by mouth 2 (two) times a day, Disp: 90 tablet, Rfl: 1      Physical Exam:  /64 (BP Location: Left arm)   Pulse 96   Temp (!) 96 5 °F (35 8 °C) (Tympanic Core)   Resp 18   Ht 5' 2" (1 575 m)   Wt 98 9 kg (218 lb)   SpO2 98%   BMI 39 87 kg/m²     Physical Exam  Constitutional:       Appearance: She is obese  HENT:      Right Ear: Tympanic membrane normal       Left Ear: Tympanic membrane normal    Eyes:      General: No scleral icterus  Cardiovascular:      Rate and Rhythm: Normal rate and regular rhythm  Heart sounds: No murmur heard  Pulmonary:      Effort: Pulmonary effort is normal  No respiratory distress  Breath sounds: Normal breath sounds  Chest:   Breasts:      Right: No axillary adenopathy or supraclavicular adenopathy  Left: No axillary adenopathy or supraclavicular adenopathy  Abdominal:      General: Abdomen is flat  There is no distension  Palpations: Abdomen is soft  There is no mass  Tenderness: There is no abdominal tenderness  Musculoskeletal:      Right lower leg: No edema  Left lower leg: No edema  Lymphadenopathy:      Head:      Right side of head: No submandibular or tonsillar adenopathy  Left side of head: No submental, submandibular or tonsillar adenopathy  Cervical: No cervical adenopathy  Upper Body:      Right upper body: No supraclavicular or axillary adenopathy  Left upper body: No supraclavicular or axillary adenopathy  Neurological:      Mental Status: She is alert     Psychiatric:         Mood and Affect: Mood normal          Behavior: Behavior normal        Labs:  Lab Results   Component Value Date    WBC 11 8 (H) 03/10/2022    HGB 13 8 03/10/2022 HCT 40 6 03/10/2022    MCV 78 (L) 03/10/2022     03/10/2022         Patient voiced understanding and agreement in the above discussion  Aware to contact our office with questions/symptoms in the interim  This note has been generated by voice recognition software system  Therefore, there may be spelling, grammar, and or syntax errors  Please contact if questions arise

## 2022-04-26 LAB
ABNORMAL WBC NFR SPEC FC: NORMAL %
ALPHA THAL REFLEX: ABNORMAL
ANNOTATION COMMENT IMP: NORMAL
CELLULARITY ASSESSMENT SPEC FC-IMP: NORMAL
CLINICAL INFO: NORMAL
DX ICD CODE: NORMAL
ERYTHROCYTE [DISTWIDTH] IN BLOOD BY AUTOMATED COUNT: 13.9 % (ref 11.7–15.4)
FERRITIN SERPL-MCNC: 566 NG/ML (ref 15–150)
HBA1 GENE MUT TESTED BLD/T: ABNORMAL
HBA1 GENE MUT TESTED BLD/T: NORMAL
HCT VFR BLD AUTO: 39.6 % (ref 34–46.6)
HGB A MFR BLD: 2.3 % (ref 1.8–3.2)
HGB A MFR BLD: 97.7 % (ref 96.4–98.8)
HGB BLD-MCNC: 13.4 G/DL (ref 11.1–15.9)
HGB F MFR BLD: 0 % (ref 0–2)
HGB FRACT BLD-IMP: ABNORMAL
HGB S MFR BLD: 0 %
IMMUNOPHENOTYPING STUDY: NORMAL
INDICATION: NORMAL
LABORATORY COMMENT REPORT: NORMAL
MCH RBC QN AUTO: 26.3 PG (ref 26.6–33)
MCHC RBC AUTO-ENTMCNC: 33.8 G/DL (ref 31.5–35.7)
MCV RBC AUTO: 78 FL (ref 79–97)
PATH INTERP SPEC-IMP: NORMAL
PATHOLOGIST NAME: NORMAL
PLATELET # BLD AUTO: 268 X10E3/UL (ref 150–450)
RBC # BLD AUTO: 5.1 X10E6/UL (ref 3.77–5.28)
RESULTING ICD9: NORMAL
SL AMB ANALYSIS AND GATING STRATEGY: NORMAL
SL AMB ASSESSMENT OF LEUKOCYTES: NORMAL
SL AMB DIANON CASE NUMBER: NORMAL
SPECIMEN SOURCE: NORMAL
VIABLE CELLS NFR SPEC: NORMAL %
WBC # BLD AUTO: 11.7 X10E3/UL (ref 3.4–10.8)

## 2022-05-02 ENCOUNTER — TELEPHONE (OUTPATIENT)
Dept: HEMATOLOGY ONCOLOGY | Facility: CLINIC | Age: 22
End: 2022-05-02

## 2022-05-02 NOTE — TELEPHONE ENCOUNTER
Left message for patient stating that we are still waiting on her test results  I spoke with Christos Dozier and the results should be in by the end of this week  Explained in message we can r/s her virtual appointment for later in the week or early next week

## 2022-05-03 NOTE — TELEPHONE ENCOUNTER
Pt called to confirm receipt of vm  Please cancel pt's pt for today and call pt back to reschedule for a later date once abs are received

## 2022-05-09 ENCOUNTER — TELEPHONE (OUTPATIENT)
Dept: HEMATOLOGY ONCOLOGY | Facility: CLINIC | Age: 22
End: 2022-05-09

## 2022-05-09 NOTE — TELEPHONE ENCOUNTER
Scheduling Appointment     Who Is Calling to Schedule Patient    Doctor Beverly Warner [place]   Date and Time 05/11 at 3:00pm   Reason for scheduling appointment Follow up    Patient verbalized understanding   Yes

## 2022-05-11 ENCOUNTER — TELEMEDICINE (OUTPATIENT)
Dept: HEMATOLOGY ONCOLOGY | Facility: CLINIC | Age: 22
End: 2022-05-11
Payer: COMMERCIAL

## 2022-05-11 DIAGNOSIS — D56.3 ALPHA THALASSEMIA TRAIT: Primary | ICD-10-CM

## 2022-05-11 DIAGNOSIS — D72.829 LEUKOCYTOSIS, UNSPECIFIED TYPE: ICD-10-CM

## 2022-05-11 PROCEDURE — 99441 PR PHYS/QHP TELEPHONE EVALUATION 5-10 MIN: CPT | Performed by: PHYSICIAN ASSISTANT

## 2022-05-11 NOTE — PROGRESS NOTES
Virtual Brief Visit    Patient is located in the following state in which I hold an active license [place]      Assessment/Plan:    Problem List Items Addressed This Visit    None         Recent Visits  No visits were found meeting these conditions  Showing recent visits within past 7 days and meeting all other requirements  Today's Visits  Date Type Provider Dept   05/11/22 [address] [place] [address],[address]th Floor, [facility] [place]   Showing today's visits and meeting all other requirements  Future Appointments  No visits were found meeting these conditions  Showing future appointments within next 150 days and meeting all other requirements     Spoke with patient's mother about patient's results  Patient was seen in consult with her parents in April 2022  She has autism and is minimally verbal      She was referred due to elevated WBC and low MCV  At consult Patient's mother stated that on historical blood work patient always has mild elevation WBC  I also note in 2019 at [facility] this finding was also consistent with her most recent readings  She underwent work up for leukemia/lymphoma flow cytometry  This showed negative for leukemia and lymphoma and report stated elevated wbc consistent with a reactive process  Alpha thalassemia testing showed positive for alpha thalassemia trait  Reviewed that is it important to tell her providers about this  She does not need iron supplement, which she had been recommended in the past due to low MCV  Mother states she herself has been told she is borderline anemic, likely that patient inherited this from her  Suggested she and her son be tested  Recommend repeat ferritin with next set up labs from PCP and continue routine monitoring of CBC-D with PCP on an every 6 month or yearly time  Follow up with PCP  Follow up here prn         I spent 10 minutes directly with the patient during this visit

## 2022-07-06 ENCOUNTER — OFFICE VISIT (OUTPATIENT)
Dept: URGENT CARE | Facility: CLINIC | Age: 22
End: 2022-07-06
Payer: COMMERCIAL

## 2022-07-06 VITALS
SYSTOLIC BLOOD PRESSURE: 128 MMHG | DIASTOLIC BLOOD PRESSURE: 78 MMHG | TEMPERATURE: 101 F | HEART RATE: 130 BPM | RESPIRATION RATE: 16 BRPM | OXYGEN SATURATION: 99 %

## 2022-07-06 DIAGNOSIS — J06.9 UPPER RESPIRATORY TRACT INFECTION, UNSPECIFIED TYPE: ICD-10-CM

## 2022-07-06 DIAGNOSIS — H65.91 RIGHT NON-SUPPURATIVE OTITIS MEDIA: Primary | ICD-10-CM

## 2022-07-06 PROCEDURE — 99213 OFFICE O/P EST LOW 20 MIN: CPT | Performed by: PHYSICIAN ASSISTANT

## 2022-07-06 RX ORDER — AZITHROMYCIN 250 MG/1
TABLET, FILM COATED ORAL
Qty: 6 TABLET | Refills: 0 | Status: SHIPPED | OUTPATIENT
Start: 2022-07-06 | End: 2022-07-10

## 2022-07-06 RX ORDER — BUPROPION HYDROCHLORIDE 75 MG/1
75 TABLET ORAL DAILY
COMMUNITY
Start: 2022-06-22 | End: 2022-08-25 | Stop reason: ALTCHOICE

## 2022-07-07 NOTE — PATIENT INSTRUCTIONS
Ear Infection   AMBULATORY CARE:   An ear infection  is also called otitis media  Blocked or swollen eustachian tubes can cause an infection  Eustachian tubes connect the middle ear to the back of the nose and throat  They drain fluid from the middle ear  You may have a buildup of fluid in your ear  Germs build up in the fluid and infection develops  Common signs and symptoms:   Ear pain    Fever or a headache    Trouble hearing    Ringing or buzzing in your ear    Plugged ear or an ear that feels full    Dizziness    Nausea or vomiting    Seek care immediately if:   You have clear fluid coming from your ear  You have a stiff neck, headache, and a fever  Call your doctor if:   You see blood or pus draining from your ear  Your ear pain gets worse or does not go away, even after treatment  The outside of your ear is red or swollen  You are vomiting or have diarrhea  You have questions or concerns about your condition or care  Medicines: You may  need any of the following:  Acetaminophen  decreases pain and fever  It is available without a doctor's order  Ask how much to take and how often to take it  Follow directions  Read the labels of all other medicines you are using to see if they also contain acetaminophen, or ask your doctor or pharmacist  Acetaminophen can cause liver damage if not taken correctly  Do not use more than 4 grams (4,000 milligrams) total of acetaminophen in one day  NSAIDs , such as ibuprofen, help decrease swelling, pain, and fever  This medicine is available with or without a doctor's order  NSAIDs can cause stomach bleeding or kidney problems in certain people  If you take blood thinner medicine, always ask your healthcare provider if NSAIDs are safe for you  Always read the medicine label and follow directions  Ear drops  may contain medicine to decrease pain and inflammation  Antibiotics  help treat a bacterial infection      Self-care:   Apply heat  on your ear for 15 to 20 minutes, 3 to 4 times a day or as directed  You can apply heat with an electric heating pad, hot water bottle, or warm compress  Always put a cloth between your skin and the heat pack to prevent burns  Heat helps decrease pain  Apply ice  on your ear for 15 to 20 minutes, 3 to 4 times a day for 2 days or as directed  Use an ice pack, or put crushed ice in a plastic bag  Cover it with a towel before you apply it to your ear  Ice decreases swelling and pain  Prevent an ear infection:   Wash your hands often  to help prevent the spread of germs  Ask everyone in your house to wash their hands with soap and water  Ask them to wash after they use the bathroom or change a diaper  Remind them to wash before they prepare or eat food  Stay away from people who are ill  Some germs spread easily and quickly through contact  Follow up with your doctor as directed:  Write down your questions so you remember to ask them during your visits  © Copyright [employer] 2022 Information is for End User's use only and may not be sold, redistributed or otherwise used for commercial purposes  All illustrations and images included in CareNotes® are the copyrighted property of [employer]  or [facility]   The above information is an  only  It is not intended as medical advice for individual conditions or treatments  Talk to your doctor, nurse or pharmacist before following any medical regimen to see if it is safe and effective for you

## 2022-07-09 NOTE — PROGRESS NOTES
[facility]        NAME: Sri Kelley is a 23 y o  female  : 2000    MRN: [id number]  DATE: 2022  TIME: 10:49 AM    Assessment and Plan   Right non-suppurative otitis media [H65 91]  1  Right non-suppurative otitis media  azithromycin (ZITHROMAX) 250 mg tablet   2  Upper respiratory tract infection, unspecified type           Patient Instructions       Follow up with PCP in 3-5 days  Proceed to  ER if symptoms worsen  Chief Complaint     Chief Complaint   Patient presents with   Dannis Barron Like Symptoms     Pt's mother reports 22 the household all had congestion and sore throat  Covid negative  Today developed a temp of 100 8  Last dose of tylenol at 1530  History of Present Illness       20-year-old autistic female presents to the clinic with her parents with nasal congestion, rhinorrhea, fever and irritated throat that started 2 weeks ago  Patient has been taking Tylenol and Motrin OTC for symptom relief  Parents state that they have taken COVID tenderness and have been negative and patient had a COVID test at home yesterday  Mother states the patient does not good in ED cares or day programs and has not been exposed to other people where she could be exposed to [facility]  Patient is vaccinated  Review of Systems   Review of Systems   Unable to perform ROS: Patient nonverbal (from mother)   Constitutional: Positive for fever  Negative for chills  HENT: Positive for congestion, rhinorrhea and sore throat  Respiratory: Positive for cough  Gastrointestinal: Negative for abdominal pain, diarrhea, nausea and vomiting  Musculoskeletal: Negative for myalgias  Neurological: Negative for dizziness, light-headedness and headaches           Current Medications       Current Outpatient Medications:     azithromycin (ZITHROMAX) 250 mg tablet, Take 2 tablets today then 1 tablet daily x 4 days, Disp: 6 tablet, Rfl: 0    risperiDONE (RisperDAL) 0 5 mg tablet, Take 1 tablet (0 5 mg total) by mouth 2 (two) times a day, Disp: 90 tablet, Rfl: 1    risperiDONE (RisperDAL) 1 mg tablet, Take 1 tablet (1 mg total) by mouth 2 (two) times a day, Disp: 90 tablet, Rfl: 1    buPROPion (WELLBUTRIN) 75 mg tablet, Take 75 mg by mouth daily (Patient not taking: Reported on 7/6/2022), Disp: , Rfl:     ciprofloxacin-dexamethasone (CIPRODEX) otic suspension, Administer 4 drops into both ears 2 (two) times a day (Patient not taking: Reported on 7/6/2022), Disp: 7 5 mL, Rfl: 0    Current Allergies     Allergies as of 07/06/2022 - Reviewed 07/06/2022   Allergen Reaction Noted    Sulfa antibiotics Other (See Comments) 06/14/2021            The following portions of the patient's history were reviewed and updated as appropriate: allergies, current medications, past family history, past medical history, past social history, past surgical history and problem list      Past Medical History:   Diagnosis Date    Autism     IBS (irritable colon syndrome)     PCOS (polycystic ovarian syndrome)        Past Surgical History:   Procedure Laterality Date    TYMPANOSTOMY TUBE PLACEMENT         Family History   Problem Relation Age of Onset    Prostate cancer Maternal Grandfather          Medications have been verified  Objective   /78   Pulse (!) 130   Temp (!) 101 °F (38 3 °C)   Resp 16   SpO2 99%   No LMP recorded  Physical Exam     Physical Exam  Vitals and nursing note reviewed  Constitutional:       General: She is not in acute distress  Appearance: She is well-developed  She is not diaphoretic  HENT:      Head: Normocephalic and atraumatic  Right Ear: Tympanic membrane is erythematous  Left Ear: Tympanic membrane normal       Nose: Mucosal edema, congestion and rhinorrhea present  Mouth/Throat:      Pharynx: Uvula midline  Posterior oropharyngeal erythema (PND) present  Cardiovascular:      Rate and Rhythm: Normal rate and regular rhythm     Pulmonary: Effort: Pulmonary effort is normal       Breath sounds: Normal breath sounds  Musculoskeletal:         General: Normal range of motion  Skin:     General: Skin is warm and dry  Findings: No rash  Neurological:      Mental Status: She is alert and oriented to person, place, and time

## 2022-07-22 ENCOUNTER — OFFICE VISIT (OUTPATIENT)
Dept: FAMILY MEDICINE CLINIC | Facility: CLINIC | Age: 22
End: 2022-07-22
Payer: COMMERCIAL

## 2022-07-22 VITALS
DIASTOLIC BLOOD PRESSURE: 80 MMHG | OXYGEN SATURATION: 98 % | SYSTOLIC BLOOD PRESSURE: 130 MMHG | HEIGHT: 62 IN | RESPIRATION RATE: 16 BRPM | BODY MASS INDEX: 39.53 KG/M2 | WEIGHT: 214.8 LBS | TEMPERATURE: 97.1 F | HEART RATE: 107 BPM

## 2022-07-22 DIAGNOSIS — F84.0 AUTISM: ICD-10-CM

## 2022-07-22 DIAGNOSIS — H66.004 RECURRENT ACUTE SUPPURATIVE OTITIS MEDIA OF RIGHT EAR WITHOUT SPONTANEOUS RUPTURE OF TYMPANIC MEMBRANE: Primary | ICD-10-CM

## 2022-07-22 PROCEDURE — 99213 OFFICE O/P EST LOW 20 MIN: CPT | Performed by: NURSE PRACTITIONER

## 2022-07-22 RX ORDER — CEFIXIME 400 MG/1
400 CAPSULE ORAL DAILY
Qty: 7 CAPSULE | Refills: 0 | Status: SHIPPED | OUTPATIENT
Start: 2022-07-22 | End: 2022-07-29

## 2022-07-22 NOTE — PROGRESS NOTES
Assessment/Plan   Problem List Items Addressed This Visit    None     Visit Diagnoses     Recurrent acute suppurative otitis media of right ear without spontaneous rupture of tympanic membrane    -  Primary    Relevant Medications    cefixime (SUPRAX) 400 mg    Autism                    Chief Complaint   Patient presents with    Follow-up     Follow up Urgent Care Earache--pulling at ears  Subjective   Patient ID: Cleo Sorto is a 23 y o  female  Vitals:    07/22/22 1121   BP: 130/80   Pulse: (!) 107   Resp: 16   Temp: (!) 97 1 °F (36 2 °C)   SpO2: 98%     Mom reports Shona Calabrese responds to Suprax in the past for ear infection instead of usual antibiotis     Earache   There is pain in the right (seen in urgent care for otitis media and treated with azithromycin, on 7/6 mom rteports she continues to pull on ear , Shona Calabrese is autisic and has miniaml communication skills ) ear  This is a recurrent problem  The current episode started in the past 7 days  The problem occurs constantly  The problem has been unchanged  There has been no fever  Pain severity now: unable to use scale  Pertinent negatives include no coughing, diarrhea, hearing loss or sore throat  She has tried antibiotics for the symptoms  The treatment provided no relief  There is no history of a chronic ear infection, hearing loss or a tympanostomy tube  The following portions of the patient's history were reviewed and updated as appropriate: allergies, past family history, past medical history, past social history, past surgical history and problem list     Review of Systems   Constitutional: Negative  HENT: Positive for ear pain  Negative for congestion, hearing loss and sore throat  Eyes: Negative  Respiratory: Negative  Negative for cough  Cardiovascular: Negative  Gastrointestinal: Negative  Negative for diarrhea  Endocrine: Negative  Genitourinary: Negative  Musculoskeletal: Negative  Skin: Negative  Allergic/Immunologic: Negative  Neurological: Negative  Hematological: Negative  Psychiatric/Behavioral: Positive for behavioral problems  Negative for sleep disturbance and suicidal ideas  The patient is hyperactive  Objective     Physical Exam  Vitals and nursing note reviewed  Constitutional:       Appearance: Normal appearance  She is obese  HENT:      Head: Normocephalic and atraumatic  Right Ear: Ear canal and external ear normal  No decreased hearing noted  Tenderness present  A middle ear effusion is present  There is no impacted cerumen  Tympanic membrane is erythematous and bulging  Tympanic membrane is not perforated  Left Ear: Ear canal and external ear normal  No decreased hearing noted  No tenderness  A middle ear effusion is present  There is no impacted cerumen  Tympanic membrane is not perforated, erythematous or bulging  Nose: Nose normal  No congestion  Eyes:      General:         Right eye: No discharge  Left eye: No discharge  Extraocular Movements: Extraocular movements intact  Conjunctiva/sclera: Conjunctivae normal    Cardiovascular:      Rate and Rhythm: Normal rate and regular rhythm  Pulses: Normal pulses  Heart sounds: Normal heart sounds  No murmur heard  Pulmonary:      Effort: Pulmonary effort is normal  No respiratory distress  Breath sounds: Normal breath sounds  Abdominal:      General: Bowel sounds are normal       Palpations: Abdomen is soft  Musculoskeletal:         General: Normal range of motion  Cervical back: Normal range of motion  Skin:     General: Skin is dry  Capillary Refill: Capillary refill takes less than 2 seconds  Neurological:      Mental Status: She is alert  Mental status is at baseline  Psychiatric:         Attention and Perception: She is inattentive  Mood and Affect: Affect is flat  Speech: Speech is delayed  Behavior: Behavior is cooperative  Cognition and Memory: Cognition is impaired  Judgment: Judgment is impulsive

## 2022-08-25 ENCOUNTER — OFFICE VISIT (OUTPATIENT)
Dept: FAMILY MEDICINE CLINIC | Facility: CLINIC | Age: 22
End: 2022-08-25
Payer: COMMERCIAL

## 2022-08-25 VITALS
WEIGHT: 213.6 LBS | OXYGEN SATURATION: 98 % | HEART RATE: 104 BPM | HEIGHT: 62 IN | SYSTOLIC BLOOD PRESSURE: 128 MMHG | DIASTOLIC BLOOD PRESSURE: 84 MMHG | TEMPERATURE: 99.2 F | BODY MASS INDEX: 39.31 KG/M2 | RESPIRATION RATE: 16 BRPM

## 2022-08-25 DIAGNOSIS — H69.83 EUSTACHIAN TUBE DYSFUNCTION, BILATERAL: Primary | ICD-10-CM

## 2022-08-25 PROCEDURE — 3725F SCREEN DEPRESSION PERFORMED: CPT | Performed by: NURSE PRACTITIONER

## 2022-08-25 PROCEDURE — 99213 OFFICE O/P EST LOW 20 MIN: CPT | Performed by: NURSE PRACTITIONER

## 2022-08-25 RX ORDER — RISPERIDONE 0.25 MG/1
0.25 TABLET ORAL 2 TIMES DAILY
COMMUNITY
Start: 2022-08-11

## 2022-08-25 RX ORDER — LAMOTRIGINE 25 MG/1
TABLET ORAL
COMMUNITY
Start: 2022-08-24 | End: 2022-08-25

## 2022-08-25 RX ORDER — METHYLPREDNISOLONE 4 MG/1
TABLET ORAL
Qty: 21 EACH | Refills: 0 | Status: SHIPPED | OUTPATIENT
Start: 2022-08-25

## 2022-08-25 RX ORDER — DEXTROAMPHETAMINE SACCHARATE, AMPHETAMINE ASPARTATE, DEXTROAMPHETAMINE SULFATE AND AMPHETAMINE SULFATE 1.25; 1.25; 1.25; 1.25 MG/1; MG/1; MG/1; MG/1
TABLET ORAL
COMMUNITY
Start: 2022-08-24 | End: 2022-08-25

## 2022-08-25 NOTE — PATIENT INSTRUCTIONS
Start taking allergy medicine like Claritin or Zyrtec once daily  Start Medrol dose pack and take as directed  Eustachian Tube Dysfunction   AMBULATORY CARE:   Eustachian tube dysfunction (ETD)  is a condition that prevents your eustachian tubes from opening properly  It can also cause them to become blocked  Eustachian tubes connect your middle ear to the back of your nose and throat  These tubes open and allow air to flow in and out when you sneeze, swallow, or yawn  Common signs and symptoms include the following:   Fullness or pressure in your ears    Muffled hearing, or a feeling you are hearing under water or have clogged ears    Pain in one or both ears    Ringing in your ears    Popping, crackling, or clicking feeling in your ears    Trouble keeping your balance    Call your doctor or otolaryngologist if:   Your symptoms do not improve or get worse  You have a fever  You have any hearing loss  You have questions or concerns about your condition or care  Treatment:  ETD may get better on its own without any treatment  If it continues, you may need any of the following:  Swallow, yawn, or chew gum  to help open your eustachian tubes  Your healthcare provider may also recommend you blow with your mouth shut and your nostrils pinched closed  Air pressure devices  push air into your nose and eustachian tubes to help relieve air pressure in your ear  Treatment for allergies  such as decongestants, antihistamines, and nasal steroids may improve ETD  They may help decrease swelling of the eustachian tubes  A myringotomy  is surgery to make a hole in your eardrum  The hole relieves pressure and lets fluid drain from your ear  A pressure equalizing (PE) tube may be used to keep the hole open and to help drain fluid  Tuboplasty  is a procedure to widen your eustachian tubes      Follow up with your doctor or otolaryngologist as directed:  Write down your questions so you remember to ask them during your visits  © Copyright [employer] 2022 Information is for End User's use only and may not be sold, redistributed or otherwise used for commercial purposes  All illustrations and images included in CareNotes® are the copyrighted property of A D A M , Inc  or Eliseo Tierney  The above information is an  only  It is not intended as medical advice for individual conditions or treatments  Talk to your doctor, nurse or pharmacist before following any medical regimen to see if it is safe and effective for you

## 2022-08-25 NOTE — PROGRESS NOTES
Assessment/Plan:    No problem-specific Assessment & Plan notes found for this encounter  Problem List Items Addressed This Visit    None     Visit Diagnoses     Eustachian tube dysfunction, bilateral    -  Primary    Relevant Medications    methylPREDNISolone 4 MG tablet therapy pack            Subjective:      Patient ID: Sita Marte is a 23 y o  female  Patient has had a bad cold in early July and it turned into ear infection which was treated with Zithromax  Ear infection briefly improved with Zithroamax and then returned and patient was placed on Suprex which helped until last week  Patient was seen in Urgent Care last Friday and had fluid in ear but no infection  Patient has become increasing agitated this week so mom is worried about ear infection again  Patient is nonverbal due to Autism so mom and dad answered all questions  Discussed starting an allergy medicine like Claritin or Zyrtec once daily to help with fluid in ears  Medrol dose pack also prescribed  The following portions of the patient's history were reviewed and updated as appropriate: allergies, current medications, past family history, past medical history, past social history, past surgical history and problem list     Review of Systems   Constitutional: Negative  Negative for chills, fatigue and fever  HENT: Positive for ear pain  Negative for congestion, ear discharge, rhinorrhea, sinus pressure, sinus pain and sore throat  Eyes: Negative  Negative for pain and discharge  Respiratory: Negative  Negative for cough, chest tightness, shortness of breath and wheezing  Cardiovascular: Negative  Negative for chest pain, palpitations and leg swelling  Gastrointestinal: Negative  Negative for abdominal pain, constipation, diarrhea, nausea and vomiting  Genitourinary: Negative  Negative for difficulty urinating, dysuria and hematuria  Musculoskeletal: Negative  Negative for arthralgias and myalgias     Skin: Negative  Negative for rash and wound  Allergic/Immunologic: Negative  Negative for environmental allergies  Neurological: Negative  Negative for dizziness, light-headedness and headaches  Hematological: Negative  Does not bruise/bleed easily  Psychiatric/Behavioral: Positive for agitation  Negative for dysphoric mood  The patient is not nervous/anxious  Objective:      /84 (BP Location: Right arm, Patient Position: Sitting, Cuff Size: Large)   Pulse 104   Temp 99 2 °F (37 3 °C)   Resp 16   Ht 5' 2" (1 575 m)   Wt 96 9 kg (213 lb 9 6 oz)   SpO2 98%   BMI 39 07 kg/m²          Physical Exam  Vitals reviewed  Constitutional:       General: She is not in acute distress  Appearance: Normal appearance  She is not ill-appearing, toxic-appearing or diaphoretic  HENT:      Head: Normocephalic and atraumatic  Right Ear: Ear canal and external ear normal  A middle ear effusion is present  There is no impacted cerumen  Tympanic membrane is not erythematous  Left Ear: Ear canal and external ear normal  A middle ear effusion is present  There is no impacted cerumen  Tympanic membrane is not erythematous  Nose: Nose normal  No congestion or rhinorrhea  Mouth/Throat:      Mouth: Mucous membranes are moist       Pharynx: Oropharynx is clear  No oropharyngeal exudate or posterior oropharyngeal erythema  Eyes:      Extraocular Movements: Extraocular movements intact  Conjunctiva/sclera: Conjunctivae normal    Cardiovascular:      Rate and Rhythm: Normal rate and regular rhythm  Pulses: Normal pulses  Heart sounds: Normal heart sounds  Pulmonary:      Effort: Pulmonary effort is normal  No respiratory distress  Breath sounds: Normal breath sounds  No wheezing  Abdominal:      General: Bowel sounds are normal       Palpations: Abdomen is soft  Tenderness: There is no abdominal tenderness     Musculoskeletal:         General: Normal range of motion  Cervical back: Normal range of motion and neck supple  Right lower leg: No edema  Left lower leg: No edema  Lymphadenopathy:      Cervical: No cervical adenopathy  Skin:     General: Skin is warm and dry  Capillary Refill: Capillary refill takes less than 2 seconds  Findings: No lesion or rash  Neurological:      General: No focal deficit present  Mental Status: She is alert and oriented to person, place, and time  Psychiatric:         Mood and Affect: Mood normal          Behavior: Behavior normal          Thought Content:  Thought content normal          Judgment: Judgment normal

## 2022-10-17 ENCOUNTER — OFFICE VISIT (OUTPATIENT)
Dept: URGENT CARE | Facility: CLINIC | Age: 22
End: 2022-10-17
Payer: COMMERCIAL

## 2022-10-17 VITALS
BODY MASS INDEX: 39.75 KG/M2 | WEIGHT: 216 LBS | OXYGEN SATURATION: 98 % | RESPIRATION RATE: 18 BRPM | TEMPERATURE: 98.3 F | SYSTOLIC BLOOD PRESSURE: 138 MMHG | HEART RATE: 136 BPM | DIASTOLIC BLOOD PRESSURE: 88 MMHG | HEIGHT: 62 IN

## 2022-10-17 DIAGNOSIS — N39.0 URINARY TRACT INFECTION WITHOUT HEMATURIA, SITE UNSPECIFIED: Primary | ICD-10-CM

## 2022-10-17 LAB
S PYO AG THROAT QL: NEGATIVE
SL AMB  POCT GLUCOSE, UA: NEGATIVE
SL AMB LEUKOCYTE ESTERASE,UA: ABNORMAL
SL AMB POCT BILIRUBIN,UA: NEGATIVE
SL AMB POCT BLOOD,UA: ABNORMAL
SL AMB POCT CLARITY,UA: ABNORMAL
SL AMB POCT COLOR,UA: YELLOW
SL AMB POCT KETONES,UA: NEGATIVE
SL AMB POCT NITRITE,UA: NEGATIVE
SL AMB POCT PH,UA: 6.5
SL AMB POCT SPECIFIC GRAVITY,UA: 1.02
SL AMB POCT URINE PROTEIN: NEGATIVE
SL AMB POCT UROBILINOGEN: 0.2

## 2022-10-17 PROCEDURE — 81002 URINALYSIS NONAUTO W/O SCOPE: CPT | Performed by: PHYSICIAN ASSISTANT

## 2022-10-17 PROCEDURE — 87880 STREP A ASSAY W/OPTIC: CPT | Performed by: PHYSICIAN ASSISTANT

## 2022-10-17 PROCEDURE — 99213 OFFICE O/P EST LOW 20 MIN: CPT | Performed by: PHYSICIAN ASSISTANT

## 2022-10-17 RX ORDER — PROPRANOLOL HYDROCHLORIDE 20 MG/1
20 TABLET ORAL 2 TIMES DAILY
COMMUNITY
Start: 2022-09-22

## 2022-10-17 RX ORDER — CEPHALEXIN 500 MG/1
500 CAPSULE ORAL EVERY 8 HOURS SCHEDULED
Qty: 30 CAPSULE | Refills: 0 | Status: SHIPPED | OUTPATIENT
Start: 2022-10-17 | End: 2022-10-27

## 2022-10-17 NOTE — PROGRESS NOTES
[facility]        NAME: Josephine Walter is a 23 y o  female  : 2000    MRN: [id number]  DATE: 2022  TIME: 4:05 PM    /88   Pulse (!) 136   Temp 98 3 °F (36 8 °C)   Resp 18   Ht 5' 2" (1 575 m)   Wt 98 kg (216 lb)   SpO2 98%   BMI 39 51 kg/m²     Assessment and Plan   Urinary tract infection without hematuria, site unspecified [N39 0]  1  Urinary tract infection without hematuria, site unspecified  POCT urine dip    Urine culture    POCT rapid strepA    cephalexin (KEFLEX) 500 mg capsule         Patient Instructions       Follow up with PCP in 3-5 days  Proceed to  ER if symptoms worsen  Chief Complaint     Chief Complaint   Patient presents with   • Fever     Mother reports fever since last night with difficulty sleeping  Treated at home with motrin  Negative at home Covid test this morning  History of Present Illness       Pt with fever at home last hilario 100 9, pt acting normal , one episode of vomtiing last hilario       Review of Systems   Review of Systems   Constitutional: Positive for fever  HENT: Negative  Eyes: Negative  Respiratory: Negative  Cardiovascular: Negative  Gastrointestinal: Negative  Endocrine: Negative  Genitourinary: Negative  Musculoskeletal: Negative  Skin: Negative  Allergic/Immunologic: Negative  Neurological: Negative  Hematological: Negative  Psychiatric/Behavioral: Negative  All other systems reviewed and are negative          Current Medications       Current Outpatient Medications:   •  cephalexin (KEFLEX) 500 mg capsule, Take 1 capsule (500 mg total) by mouth every 8 (eight) hours for 10 days, Disp: 30 capsule, Rfl: 0  •  risperiDONE (RisperDAL) 0 25 mg tablet, Take 0 25 mg by mouth 2 (two) times a day, Disp: , Rfl:   •  risperiDONE (RisperDAL) 0 5 mg tablet, Take 1 tablet (0 5 mg total) by mouth 2 (two) times a day, Disp: 90 tablet, Rfl: 1  •  risperiDONE (RisperDAL) 1 mg tablet, Take 1 tablet (1 mg total) by mouth 2 (two) times a day, Disp: 90 tablet, Rfl: 1  •  methylPREDNISolone 4 MG tablet therapy pack, Use as directed on package (Patient not taking: Reported on 10/17/2022), Disp: 21 each, Rfl: 0  •  propranolol (INDERAL) 20 mg tablet, Take 20 mg by mouth 2 (two) times a day (Patient not taking: Reported on 10/17/2022), Disp: , Rfl:     Current Allergies     Allergies as of 10/17/2022 - Reviewed 10/17/2022   Allergen Reaction Noted   • Sulfa antibiotics Other (See Comments) 06/14/2021            The following portions of the patient's history were reviewed and updated as appropriate: allergies, current medications, past family history, past medical history, past social history, past surgical history and problem list      Past Medical History:   Diagnosis Date   • Autism    • IBS (irritable colon syndrome)    • PCOS (polycystic ovarian syndrome)        Past Surgical History:   Procedure Laterality Date   • TYMPANOSTOMY TUBE PLACEMENT         Family History   Problem Relation Age of Onset   • Prostate cancer Maternal Grandfather          Medications have been verified  Objective   /88   Pulse (!) 136   Temp 98 3 °F (36 8 °C)   Resp 18   Ht 5' 2" (1 575 m)   Wt 98 kg (216 lb)   SpO2 98%   BMI 39 51 kg/m²        Physical Exam     Physical Exam  Vitals and nursing note reviewed  Constitutional:       Appearance: Normal appearance  She is normal weight  Comments: Alert and playful ,eating normal today   Home covid test negative as per mother    HENT:      Head: Normocephalic and atraumatic  Right Ear: Tympanic membrane, ear canal and external ear normal       Left Ear: Tympanic membrane, ear canal and external ear normal       Nose: Rhinorrhea present  Mouth/Throat:      Pharynx: Posterior oropharyngeal erythema present  Eyes:      Extraocular Movements: Extraocular movements intact        Conjunctiva/sclera: Conjunctivae normal       Pupils: Pupils are equal, round, and reactive to light  Cardiovascular:      Rate and Rhythm: Normal rate and regular rhythm  Pulses: Normal pulses  Heart sounds: Normal heart sounds  Pulmonary:      Effort: Pulmonary effort is normal       Breath sounds: Normal breath sounds  Abdominal:      General: Abdomen is flat  Bowel sounds are normal       Palpations: Abdomen is soft  Musculoskeletal:         General: Normal range of motion  Cervical back: Normal range of motion and neck supple  Skin:     General: Skin is warm  Capillary Refill: Capillary refill takes less than 2 seconds  Neurological:      General: No focal deficit present  Mental Status: She is alert and oriented to person, place, and time     Psychiatric:         Mood and Affect: Mood normal          Behavior: Behavior normal

## 2022-10-20 ENCOUNTER — PATIENT MESSAGE (OUTPATIENT)
Dept: FAMILY MEDICINE CLINIC | Facility: CLINIC | Age: 22
End: 2022-10-20

## 2022-10-20 DIAGNOSIS — R31.1 BENIGN ESSENTIAL MICROSCOPIC HEMATURIA: Primary | ICD-10-CM

## 2022-10-20 LAB
BACTERIA UR CULT: NORMAL
Lab: NORMAL

## 2022-10-21 ENCOUNTER — PATIENT MESSAGE (OUTPATIENT)
Dept: FAMILY MEDICINE CLINIC | Facility: CLINIC | Age: 22
End: 2022-10-21

## 2022-10-21 DIAGNOSIS — N30.01 ACUTE CYSTITIS WITH HEMATURIA: Primary | ICD-10-CM

## 2023-01-13 ENCOUNTER — OFFICE VISIT (OUTPATIENT)
Dept: URGENT CARE | Facility: CLINIC | Age: 23
End: 2023-01-13

## 2023-01-13 VITALS — TEMPERATURE: 97.8 F | HEART RATE: 90 BPM | OXYGEN SATURATION: 98 % | RESPIRATION RATE: 16 BRPM

## 2023-01-13 DIAGNOSIS — H60.333 ACUTE SWIMMER'S EAR OF BOTH SIDES: Primary | ICD-10-CM

## 2023-01-13 RX ORDER — OFLOXACIN 3 MG/ML
10 SOLUTION AURICULAR (OTIC) DAILY
Qty: 10 ML | Refills: 0 | Status: SHIPPED | OUTPATIENT
Start: 2023-01-13 | End: 2023-01-20

## 2023-01-13 NOTE — PATIENT INSTRUCTIONS
Swimmer's Ear   AMBULATORY CARE:   Swimmer's ear , also called otitis externa, is an infection in the outer ear canal  This canal goes from the outside of your ear to your eardrum  Swimmer's ear most often occurs when water remains in your ear after you swim  This creates a moist area for bacteria to grow  Scratches or damage from your fingers, cotton swabs, or other objects can also cause swimmer's ear  Common signs and symptoms:   Ear pain    Red, swollen, itchy ear canal    Fluid or pus draining from your ear    A plugged ear and trouble hearing    Seek care immediately if:   You have severe ear pain  You are suddenly not able to hear  You have new swelling in your face, behind your ears, or in your neck  You suddenly cannot move part of your face, or it feels numb  Call your doctor if:   You have a fever  Your symptoms get worse or do not go away, even after treatment  You have questions or concerns about your condition or care  Treatment for swimmer's ear  may include cleaning your outer ear canal first  This will help clean any ear wax, flaky skin, or other discharge  You may then need any of the following:  Medicines:      Ear drops  help fight infection and decrease redness and swelling  Acetaminophen  decreases pain and fever  It is available without a doctor's order  Ask how much to take and how often to take it  Follow directions  Read the labels of all other medicines you are using to see if they also contain acetaminophen, or ask your doctor or pharmacist  Acetaminophen can cause liver damage if not taken correctly  Do not use more than 4 grams (4,000 milligrams) total of acetaminophen in one day  NSAIDs , such as ibuprofen, help decrease swelling, pain, and fever  This medicine is available with or without a doctor's order  NSAIDs can cause stomach bleeding or kidney problems in certain people   If you take blood thinner medicine, always ask your healthcare provider if NSAIDs are safe for you  Always read the medicine label and follow directions  An ear wick  may be used if your ear canal is blocked  A wick (small tube) made of cotton or gauze is placed in your ear  The wick helps pull extra fluid out of your ear canal  Hussein also help draw medicine into your ear canal     How to use ear drops:   Warm the bottle of ear drops in your hands  for a few minutes  Lie down on your side with your infected ear facing up  This will help the medicine travel completely through your ear canal     Gently pull the ear up and back  Carefully drip the correct number of ear drops into your ear  Have another person help you if possible  For children younger than 3 years,  gently pull and hold the ear down and back  For children older than 3 years,  gently pull and hold the ear up and back  Stay in the same position  for 3 to 5 minutes to let the medicine soak in  Prevent swimmer's ear:   Dry your ears completely after you swim or bathe  Gently wipe your outer ear with a soft towel or cloth  Use ear plugs when you swim  Do not put cotton swabs or other objects in your ears  These can scratch or damage your ear  They can also push ear wax deeper in and irritate your ear  Put cotton balls gently in your outer ear  when you apply hair spray, hair dye, or perfume  Follow up with your doctor as directed:  Write down your questions so you remember to ask them during your visits  © Copyright [address] 2022 Information is for End User's use only and may not be sold, redistributed or otherwise used for commercial purposes  All illustrations and images included in CareNotes® are the copyrighted property of A [employer]  or [place] Donald Tierney  The above information is an  only  It is not intended as medical advice for individual conditions or treatments   Talk to your doctor, nurse or pharmacist before following any medical regimen to see if it is safe and effective for you

## 2023-02-23 NOTE — PROGRESS NOTES
[facility] Now        NAME: Nancy Stanton is a 23 y o  female  : 2000    MRN: [id number]  DATE: 2023  TIME: 11:51 PM    Assessment and Plan   Acute swimmer's ear of both sides [H60 333]  1  Acute swimmer's ear of both sides  ofloxacin (FLOXIN) 0 3 % otic solution            Patient Instructions       Follow up with PCP in 3-5 days  Proceed to  ER if symptoms worsen  Chief Complaint     Chief Complaint   Patient presents with   • Earache     Pt's mother reports her ears look red  She is concerned for an ear infection  History of Present Illness       24 yo F presents with bilateral earaches  Pt states that her ears look red  She is concerned for an ear infection  Review of Systems   Review of Systems   Constitutional: Negative for chills and fever  HENT: Positive for ear discharge and ear pain  Negative for congestion, rhinorrhea and sore throat  Respiratory: Negative for cough  Gastrointestinal: Negative for abdominal pain, diarrhea, nausea and vomiting  Musculoskeletal: Negative for myalgias  Neurological: Negative for dizziness, light-headedness and headaches           Current Medications       Current Outpatient Medications:   •  risperiDONE (RisperDAL) 0 25 mg tablet, Take 0 25 mg by mouth 2 (two) times a day, Disp: , Rfl:   •  risperiDONE (RisperDAL) 0 5 mg tablet, Take 1 tablet (0 5 mg total) by mouth 2 (two) times a day, Disp: 90 tablet, Rfl: 1  •  risperiDONE (RisperDAL) 1 mg tablet, Take 1 tablet (1 mg total) by mouth 2 (two) times a day, Disp: 90 tablet, Rfl: 1  •  methylPREDNISolone 4 MG tablet therapy pack, Use as directed on package (Patient not taking: Reported on 10/17/2022), Disp: 21 each, Rfl: 0  •  propranolol (INDERAL) 20 mg tablet, Take 20 mg by mouth 2 (two) times a day (Patient not taking: Reported on 10/17/2022), Disp: , Rfl:     Current Allergies     Allergies as of 2023 - Reviewed 2023   Allergen Reaction Noted   • Sulfa antibiotics Other (See Comments) 06/14/2021            The following portions of the patient's history were reviewed and updated as appropriate: allergies, current medications, past family history, past medical history, past social history, past surgical history and problem list      Past Medical History:   Diagnosis Date   • Autism    • IBS (irritable colon syndrome)    • PCOS (polycystic ovarian syndrome)        Past Surgical History:   Procedure Laterality Date   • TYMPANOSTOMY TUBE PLACEMENT         Family History   Problem Relation Age of Onset   • Prostate cancer Maternal Grandfather          Medications have been verified  Objective   Pulse 90   Temp 97 8 °F (36 6 °C)   Resp 16   SpO2 98%   No LMP recorded  Physical Exam     Physical Exam  Vitals and nursing note reviewed  Constitutional:       General: She is not in acute distress  Appearance: She is well-developed  She is not diaphoretic  HENT:      Head: Normocephalic and atraumatic  Right Ear: Drainage and tenderness present  Left Ear: Drainage and tenderness present  Nose: Nose normal    Eyes:      Conjunctiva/sclera: Conjunctivae normal    Pulmonary:      Effort: Pulmonary effort is normal    Musculoskeletal:         General: Normal range of motion  Cervical back: Normal range of motion and neck supple  Skin:     General: Skin is warm and dry  Findings: No rash  Neurological:      Mental Status: She is alert and oriented to person, place, and time

## 2023-04-02 ENCOUNTER — OFFICE VISIT (OUTPATIENT)
Dept: URGENT CARE | Facility: CLINIC | Age: 23
End: 2023-04-02

## 2023-04-02 VITALS
RESPIRATION RATE: 20 BRPM | BODY MASS INDEX: 38.15 KG/M2 | HEART RATE: 104 BPM | SYSTOLIC BLOOD PRESSURE: 127 MMHG | HEIGHT: 65 IN | DIASTOLIC BLOOD PRESSURE: 83 MMHG | TEMPERATURE: 99.4 F | WEIGHT: 229 LBS | OXYGEN SATURATION: 98 %

## 2023-04-02 DIAGNOSIS — H65.02 NON-RECURRENT ACUTE SEROUS OTITIS MEDIA OF LEFT EAR: Primary | ICD-10-CM

## 2023-04-02 RX ORDER — AMOXICILLIN 875 MG/1
875 TABLET, COATED ORAL 2 TIMES DAILY
Qty: 14 TABLET | Refills: 0 | Status: SHIPPED | OUTPATIENT
Start: 2023-04-02 | End: 2023-04-09

## 2023-04-02 NOTE — PROGRESS NOTES
[facility] Now        NAME: Ross Hughes is a 23 y o  female  : 2000    MRN: [id number]  DATE: 2023  TIME: 5:32 PM    Assessment and Plan   Non-recurrent acute serous otitis media of left ear [H65 02]  1  Non-recurrent acute serous otitis media of left ear  amoxicillin (AMOXIL) 875 mg tablet            Patient Instructions     Otitis media:  Take antibiotics as directed until completed   Follow up with PCP in 3-5 days  Proceed to  ER if symptoms worsen  Chief Complaint     Chief Complaint   Patient presents with   • Earache     Per mother couple days ago patient acting unhappy  Patient has history of ear infections and autism         History of Present Illness       HPI  22 y/o female presents for evaluation of bilateral ear pain  She is accompanied by both her parents  Patient has autism and is minimally verbal, mother provides history  She states she started to become irritable two days and c/o ear pain  She has a h/o recurrent ear infections although none in the last 6 months  (1 episode of otitis externa approximately 5-6 months ago)  Mother denies fever/chills/sore throat/cough/headache/n/v/d  Review of Systems   Review of Systems   Constitutional: Negative for chills and fever  HENT: Positive for ear pain  Negative for ear discharge and sore throat  Eyes: Negative for pain and visual disturbance  Respiratory: Negative for cough and shortness of breath  Cardiovascular: Negative for chest pain and palpitations  Gastrointestinal: Negative for abdominal pain and vomiting  Genitourinary: Negative for dysuria and hematuria  Musculoskeletal: Negative for arthralgias and back pain  Skin: Negative for color change and rash  Neurological: Negative for seizures and syncope  All other systems reviewed and are negative          Current Medications       Current Outpatient Medications:   •  amoxicillin (AMOXIL) 875 mg tablet, Take 1 tablet (875 mg total) by mouth 2 "(two) times a day for 7 days, Disp: 14 tablet, Rfl: 0  •  risperiDONE (RisperDAL) 0 25 mg tablet, Take 0 25 mg by mouth 2 (two) times a day, Disp: , Rfl:   •  risperiDONE (RisperDAL) 0 5 mg tablet, Take 1 tablet (0 5 mg total) by mouth 2 (two) times a day, Disp: 90 tablet, Rfl: 1  •  risperiDONE (RisperDAL) 1 mg tablet, Take 1 tablet (1 mg total) by mouth 2 (two) times a day, Disp: 90 tablet, Rfl: 1  •  methylPREDNISolone 4 MG tablet therapy pack, Use as directed on package (Patient not taking: Reported on 10/17/2022), Disp: 21 each, Rfl: 0  •  propranolol (INDERAL) 20 mg tablet, Take 20 mg by mouth 2 (two) times a day (Patient not taking: Reported on 10/17/2022), Disp: , Rfl:     Current Allergies     Allergies as of 04/02/2023 - Reviewed 04/02/2023   Allergen Reaction Noted   • Sulfa antibiotics Other (See Comments) 06/14/2021            The following portions of the patient's history were reviewed and updated as appropriate: allergies, current medications, past family history, past medical history, past social history, past surgical history and problem list      Past Medical History:   Diagnosis Date   • Autism    • Autism    • IBS (irritable colon syndrome)    • PCOS (polycystic ovarian syndrome)        Past Surgical History:   Procedure Laterality Date   • TYMPANOSTOMY TUBE PLACEMENT         Family History   Problem Relation Age of Onset   • Prostate cancer Maternal Grandfather          Medications have been verified  Objective   /83   Pulse 104   Temp 99 4 °F (37 4 °C) (Tympanic)   Resp 20   Ht 5' 5\" (1 651 m)   Wt 104 kg (229 lb)   SpO2 98%   BMI 38 11 kg/m²   No LMP recorded  Physical Exam     Physical Exam  Vitals and nursing note reviewed  Constitutional:       General: She is not in acute distress  Appearance: Normal appearance  HENT:      Head: Normocephalic and atraumatic        Right Ear: Tympanic membrane normal       Left Ear: Ear canal normal       Ears:      " Comments: Left TM erythematous with serous exudate     Nose: No congestion  Mouth/Throat:      Mouth: Mucous membranes are moist       Pharynx: No oropharyngeal exudate or posterior oropharyngeal erythema  Cardiovascular:      Rate and Rhythm: Normal rate and regular rhythm  Pulses: Normal pulses  Heart sounds: Normal heart sounds  Pulmonary:      Effort: Pulmonary effort is normal       Breath sounds: Normal breath sounds  Lymphadenopathy:      Cervical: No cervical adenopathy  Skin:     General: Skin is warm and dry  Neurological:      Mental Status: She is alert and oriented to person, place, and time     Psychiatric:         Mood and Affect: Mood normal          Behavior: Behavior normal

## 2023-04-02 NOTE — PATIENT INSTRUCTIONS
Otitis media:  Take antibiotics as directed until completed   Follow up with PCP in 3-5 days  Proceed to  ER if symptoms worsen

## 2023-08-04 ENCOUNTER — OFFICE VISIT (OUTPATIENT)
Dept: URGENT CARE | Facility: CLINIC | Age: 23
End: 2023-08-04
Payer: COMMERCIAL

## 2023-08-04 VITALS
WEIGHT: 235 LBS | SYSTOLIC BLOOD PRESSURE: 134 MMHG | BODY MASS INDEX: 40.12 KG/M2 | OXYGEN SATURATION: 98 % | DIASTOLIC BLOOD PRESSURE: 97 MMHG | HEIGHT: 64 IN | TEMPERATURE: 97.8 F | RESPIRATION RATE: 20 BRPM | HEART RATE: 94 BPM

## 2023-08-04 DIAGNOSIS — H66.92 LEFT OTITIS MEDIA, UNSPECIFIED OTITIS MEDIA TYPE: Primary | ICD-10-CM

## 2023-08-04 PROCEDURE — 99213 OFFICE O/P EST LOW 20 MIN: CPT | Performed by: NURSE PRACTITIONER

## 2023-08-04 RX ORDER — AMOXICILLIN AND CLAVULANATE POTASSIUM 875; 125 MG/1; MG/1
1 TABLET, FILM COATED ORAL EVERY 12 HOURS SCHEDULED
Qty: 14 TABLET | Refills: 0 | Status: SHIPPED | OUTPATIENT
Start: 2023-08-04 | End: 2023-08-11

## 2023-08-04 NOTE — PROGRESS NOTES
[facility] Now        NAME: Ramon Blake is a 20 y.o. female  : 2000    MRN: [id number]  DATE: 2023  TIME: 8:12 AM    Assessment and Plan   Left otitis media, unspecified otitis media type [H66.92]  1. Left otitis media, unspecified otitis media type  amoxicillin-clavulanate (AUGMENTIN) 875-125 mg per tablet            Patient Instructions     Will recommend start augmentin twice daily x 10 days. Educated on s/e and proper use of medication and follow up with PCP in 3-5 days or with worsening of symptoms/no improvment. Proceed to  ER if symptoms worsen. Chief Complaint     Chief Complaint   Patient presents with   • Earache     Left ear pain noticed by mother today. No other complaints. History of Present Illness       Patient reports with complaints of left ear pain. Had covid approx . Denies fever, nc, rhinorrhea, cough. Mother reports has been pulling and picking at ear       Review of Systems   Review of Systems   Constitutional: Negative for activity change, appetite change, chills, fatigue and fever. HENT: Positive for ear pain (left ear). Negative for congestion, postnasal drip, rhinorrhea, sneezing and sore throat. Respiratory: Negative for cough, chest tightness, shortness of breath and wheezing. Cardiovascular: Negative for chest pain and palpitations. Gastrointestinal: Negative for abdominal pain, constipation, diarrhea, nausea and vomiting. Musculoskeletal: Negative for arthralgias and myalgias. Skin: Negative for color change, pallor and rash. Neurological: Negative for dizziness, weakness, light-headedness and headaches. Hematological: Negative for adenopathy. Psychiatric/Behavioral: Negative for agitation and confusion.          Current Medications       Current Outpatient Medications:   •  amoxicillin-clavulanate (AUGMENTIN) 875-125 mg per tablet, Take 1 tablet by mouth every 12 (twelve) hours for 7 days, Disp: 14 tablet, Rfl: 0  • methylPREDNISolone 4 MG tablet therapy pack, Use as directed on package (Patient not taking: Reported on 10/17/2022), Disp: 21 each, Rfl: 0  •  propranolol (INDERAL) 20 mg tablet, Take 20 mg by mouth 2 (two) times a day (Patient not taking: Reported on 10/17/2022), Disp: , Rfl:   •  risperiDONE (RisperDAL) 0.25 mg tablet, Take 0.25 mg by mouth 2 (two) times a day, Disp: , Rfl:   •  risperiDONE (RisperDAL) 0.5 mg tablet, Take 1 tablet (0.5 mg total) by mouth 2 (two) times a day, Disp: 90 tablet, Rfl: 1  •  risperiDONE (RisperDAL) 1 mg tablet, Take 1 tablet (1 mg total) by mouth 2 (two) times a day, Disp: 90 tablet, Rfl: 1    Current Allergies     Allergies as of 08/04/2023 - Reviewed 08/04/2023   Allergen Reaction Noted   • Sulfa antibiotics Other (See Comments) 06/14/2021            The following portions of the patient's history were reviewed and updated as appropriate: allergies, current medications, past family history, past medical history, past social history, past surgical history and problem list.     Past Medical History:   Diagnosis Date   • Autism    • Autism    • IBS (irritable colon syndrome)    • PCOS (polycystic ovarian syndrome)        Past Surgical History:   Procedure Laterality Date   • TYMPANOSTOMY TUBE PLACEMENT         Family History   Problem Relation Age of Onset   • Prostate cancer Maternal Grandfather          Medications have been verified. Objective   /70   Pulse 86   Temp 99.4 °F (37.4 °C) (Tympanic)   Resp 18   SpO2 96%   No LMP recorded. Physical Exam     Physical Exam  Vitals and nursing note reviewed. Constitutional:       General: She is not in acute distress. Appearance: Normal appearance. She is not ill-appearing or diaphoretic. HENT:      Head: Normocephalic and atraumatic. Right Ear: Tympanic membrane is not erythematous or bulging. Left Ear: No swelling or tenderness. Tympanic membrane is erythematous and bulging.       Nose: No congestion or rhinorrhea. Eyes:      General: No scleral icterus. Right eye: No discharge. Left eye: No discharge. Cardiovascular:      Rate and Rhythm: Normal rate and regular rhythm. Pulmonary:      Effort: Pulmonary effort is normal. No respiratory distress. Breath sounds: No stridor. No rhonchi or rales. Musculoskeletal:         General: Normal range of motion. Cervical back: Normal range of motion. Skin:     Coloration: Skin is not jaundiced or pale. Findings: No bruising, erythema or rash. Neurological:      General: No focal deficit present. Mental Status: She is alert and oriented to person, place, and time. Psychiatric:         Mood and Affect: Mood normal.         Behavior: Behavior normal.         Thought Content:  Thought content normal.         Judgment: Judgment normal.

## 2023-08-05 VITALS
TEMPERATURE: 99.4 F | OXYGEN SATURATION: 96 % | HEART RATE: 86 BPM | DIASTOLIC BLOOD PRESSURE: 70 MMHG | SYSTOLIC BLOOD PRESSURE: 124 MMHG | RESPIRATION RATE: 18 BRPM

## 2023-08-15 ENCOUNTER — OFFICE VISIT (OUTPATIENT)
Dept: URGENT CARE | Facility: CLINIC | Age: 23
End: 2023-08-15
Payer: COMMERCIAL

## 2023-08-15 VITALS
HEIGHT: 64 IN | RESPIRATION RATE: 16 BRPM | HEART RATE: 113 BPM | OXYGEN SATURATION: 97 % | TEMPERATURE: 98.4 F | BODY MASS INDEX: 40.12 KG/M2 | WEIGHT: 235 LBS

## 2023-08-15 DIAGNOSIS — H92.01 RIGHT EAR PAIN: Primary | ICD-10-CM

## 2023-08-15 PROCEDURE — 99213 OFFICE O/P EST LOW 20 MIN: CPT | Performed by: NURSE PRACTITIONER

## 2023-08-15 NOTE — PROGRESS NOTES
[facility] Now        NAME: Paradise Choi is a 20 y.o. female  : 2000    MRN: [id number]  DATE: August 15, 2023  TIME: 7:32 PM    Assessment and Plan   Right ear pain [H92.01]  1. Right ear pain          Acute symptomatic patient and reports with right ear pain. Patient is nonverbal so they are unsure if she has an ear infection or what. Was treated recently for a left ear infection also would like to see if it is improved. No signs and symptoms of infection noted on exam of either ear, all other physical exam within normal limits. Educated can take over-the-counter Tylenol as needed for pain but at this point no need for antibiotic use mother and father verbalized understanding. If worsens f/u with pcp or come back to . Patient Instructions       Follow up with PCP in 3-5 days. Proceed to  ER if symptoms worsen. Chief Complaint     Chief Complaint   Patient presents with   • Earache     Mother reports left ear infection dx on  when seen in office. Treated with seven day course of Augmentin. Completed course on . States right ear pulling with onset Thursday. Denies any other symptoms. History of Present Illness       Patient arrives with mother and father with c/o right ear pain. Reports she had left ear infection recently and took augmentin and now picking at right ear. Mother with concerns and would like verification that left ear infection improved as well. Review of Systems   Review of Systems   Constitutional: Negative for activity change, appetite change, chills, fatigue and fever. HENT: Positive for ear pain (right). Negative for congestion, ear discharge, postnasal drip, rhinorrhea, sneezing and sore throat. Respiratory: Negative for cough, chest tightness, shortness of breath and wheezing. Cardiovascular: Negative for chest pain and palpitations. Gastrointestinal: Negative for abdominal pain, constipation, diarrhea, nausea and vomiting. Musculoskeletal: Negative for arthralgias and myalgias. Skin: Negative for color change, pallor and rash. Neurological: Negative for dizziness, weakness, light-headedness and headaches. Hematological: Negative for adenopathy. Psychiatric/Behavioral: Negative for agitation and confusion. Current Medications       Current Outpatient Medications:   •  risperiDONE (RisperDAL) 0.25 mg tablet, Take 0.25 mg by mouth 2 (two) times a day, Disp: , Rfl:   •  risperiDONE (RisperDAL) 0.5 mg tablet, Take 1 tablet (0.5 mg total) by mouth 2 (two) times a day, Disp: 90 tablet, Rfl: 1  •  risperiDONE (RisperDAL) 1 mg tablet, Take 1 tablet (1 mg total) by mouth 2 (two) times a day, Disp: 90 tablet, Rfl: 1  •  methylPREDNISolone 4 MG tablet therapy pack, Use as directed on package (Patient not taking: Reported on 10/17/2022), Disp: 21 each, Rfl: 0  •  propranolol (INDERAL) 20 mg tablet, Take 20 mg by mouth 2 (two) times a day (Patient not taking: Reported on 10/17/2022), Disp: , Rfl:     Current Allergies     Allergies as of 08/15/2023 - Reviewed 08/15/2023   Allergen Reaction Noted   • Sulfa antibiotics Other (See Comments) 06/14/2021            The following portions of the patient's history were reviewed and updated as appropriate: allergies, current medications, past family history, past medical history, past social history, past surgical history and problem list.     Past Medical History:   Diagnosis Date   • Autism    • Autism    • IBS (irritable colon syndrome)    • PCOS (polycystic ovarian syndrome)        Past Surgical History:   Procedure Laterality Date   • TYMPANOSTOMY TUBE PLACEMENT         Family History   Problem Relation Age of Onset   • Prostate cancer Maternal Grandfather          Medications have been verified. Objective   Pulse (!) 113   Temp 98.4 °F (36.9 °C)   Resp 16   Ht 5' 4" (1.626 m)   Wt 107 kg (235 lb)   SpO2 97%   BMI 40.34 kg/m²   No LMP recorded.        Physical Exam Physical Exam  Vitals and nursing note reviewed. Constitutional:       General: She is not in acute distress. Appearance: Normal appearance. She is not ill-appearing or diaphoretic. HENT:      Head: Normocephalic and atraumatic. Right Ear: Tympanic membrane, ear canal and external ear normal. There is no impacted cerumen. Left Ear: Tympanic membrane, ear canal and external ear normal. There is no impacted cerumen. Nose: No congestion or rhinorrhea. Mouth/Throat:      Pharynx: No posterior oropharyngeal erythema. Eyes:      General: No scleral icterus. Right eye: No discharge. Left eye: No discharge. Conjunctiva/sclera: Conjunctivae normal.   Cardiovascular:      Rate and Rhythm: Normal rate and regular rhythm. Pulmonary:      Effort: Pulmonary effort is normal. No respiratory distress. Breath sounds: Normal breath sounds. No stridor. No wheezing, rhonchi or rales. Musculoskeletal:         General: Normal range of motion. Cervical back: Normal range of motion. Skin:     Coloration: Skin is not jaundiced or pale. Findings: No bruising, erythema or rash. Neurological:      General: No focal deficit present. Mental Status: She is alert and oriented to person, place, and time. Psychiatric:         Mood and Affect: Mood normal.         Behavior: Behavior normal.         Thought Content:  Thought content normal.         Judgment: Judgment normal.

## 2023-10-10 ENCOUNTER — OFFICE VISIT (OUTPATIENT)
Dept: URGENT CARE | Facility: CLINIC | Age: 23
End: 2023-10-10
Payer: COMMERCIAL

## 2023-10-10 VITALS
TEMPERATURE: 98.7 F | HEART RATE: 125 BPM | HEIGHT: 64 IN | RESPIRATION RATE: 18 BRPM | OXYGEN SATURATION: 97 % | BODY MASS INDEX: 40.34 KG/M2

## 2023-10-10 DIAGNOSIS — N39.0 URINARY TRACT INFECTION WITHOUT HEMATURIA, SITE UNSPECIFIED: Primary | ICD-10-CM

## 2023-10-10 LAB
SL AMB  POCT GLUCOSE, UA: NEGATIVE
SL AMB LEUKOCYTE ESTERASE,UA: ABNORMAL
SL AMB POCT BILIRUBIN,UA: NEGATIVE
SL AMB POCT BLOOD,UA: ABNORMAL
SL AMB POCT CLARITY,UA: ABNORMAL
SL AMB POCT COLOR,UA: YELLOW
SL AMB POCT KETONES,UA: NEGATIVE
SL AMB POCT NITRITE,UA: NEGATIVE
SL AMB POCT PH,UA: 6
SL AMB POCT SPECIFIC GRAVITY,UA: 1.02
SL AMB POCT URINE PROTEIN: ABNORMAL
SL AMB POCT UROBILINOGEN: 0.2

## 2023-10-10 PROCEDURE — 99213 OFFICE O/P EST LOW 20 MIN: CPT | Performed by: PHYSICIAN ASSISTANT

## 2023-10-10 PROCEDURE — 81002 URINALYSIS NONAUTO W/O SCOPE: CPT | Performed by: PHYSICIAN ASSISTANT

## 2023-10-10 RX ORDER — CEPHALEXIN 500 MG/1
500 CAPSULE ORAL EVERY 8 HOURS SCHEDULED
Qty: 30 CAPSULE | Refills: 0 | Status: SHIPPED | OUTPATIENT
Start: 2023-10-10 | End: 2023-10-20

## 2023-10-12 LAB
BACTERIA UR CULT: NORMAL
Lab: NORMAL

## 2024-01-22 ENCOUNTER — TELEPHONE (OUTPATIENT)
Dept: FAMILY MEDICINE CLINIC | Facility: CLINIC | Age: 24
End: 2024-01-22

## 2024-01-22 NOTE — TELEPHONE ENCOUNTER
Received records request for a new PCP.  I scanned a copy to patients chart.  Can you take Kanika Gallagher out as patients PCP?  Thank you very much.

## 2024-01-28 NOTE — TELEPHONE ENCOUNTER
01/28/24 7:07 AM        The office's request has been received, reviewed, and the patient chart updated. The PCP has successfully been removed with a patient attribution note. This message will now be completed.        Thank you  Isaías Forrest

## 2024-04-16 ENCOUNTER — OFFICE VISIT (OUTPATIENT)
Dept: URGENT CARE | Facility: CLINIC | Age: 24
End: 2024-04-16
Payer: COMMERCIAL

## 2024-04-16 VITALS
OXYGEN SATURATION: 96 % | WEIGHT: 235 LBS | HEART RATE: 111 BPM | RESPIRATION RATE: 20 BRPM | TEMPERATURE: 99.3 F | BODY MASS INDEX: 40.34 KG/M2

## 2024-04-16 DIAGNOSIS — H60.393 ACUTE INFECTIVE OTITIS EXTERNA, BILATERAL: Primary | ICD-10-CM

## 2024-04-16 PROCEDURE — 99213 OFFICE O/P EST LOW 20 MIN: CPT | Performed by: PHYSICIAN ASSISTANT

## 2024-04-16 RX ORDER — OFLOXACIN 3 MG/ML
10 SOLUTION AURICULAR (OTIC) DAILY
Qty: 5 ML | Refills: 0 | Status: SHIPPED | OUTPATIENT
Start: 2024-04-16 | End: 2024-04-23

## 2024-04-16 NOTE — PROGRESS NOTES
[facility] Now        NAME: Roro Anaya is a 24 y.o. female  : 2000    MRN: [id number]  DATE: 2024  TIME: 8:00 PM    Assessment and Plan   Acute infective otitis externa, bilateral [H60.393]  1. Acute infective otitis externa, bilateral  ofloxacin (FLOXIN) 0.3 % otic solution            Patient Instructions       Follow up with PCP in 3-5 days.  Proceed to  ER if symptoms worsen.    If tests have been performed at [facility], our office will contact you with results if changes need to be made to the care plan discussed with you at the visit.  You can review your full results on [facility].    Chief Complaint     Chief Complaint   Patient presents with    Earache     Mother reports possible bilateral ear pain. States pt pulling bilateral. Managing symptoms with tylenol. Denies any fever.          History of Present Illness       Patient presents with taking out her ears for the past few days.  Patient's mother looked in the ears with her otoscope and sees a lot of drainage.  Patient prone to outer ear infections.  No other URI symptoms.    Earache   Associated symptoms include ear discharge. Pertinent negatives include no rhinorrhea.       Review of Systems   Review of Systems   Constitutional:  Negative for fatigue and fever.   HENT:  Positive for ear discharge and ear pain. Negative for congestion and rhinorrhea.          Current Medications       Current Outpatient Medications:     ofloxacin (FLOXIN) 0.3 % otic solution, Administer 10 drops into both ears daily for 7 days, Disp: 5 mL, Rfl: 0    risperiDONE (RisperDAL) 0.25 mg tablet, Take 0.25 mg by mouth 2 (two) times a day, Disp: , Rfl:     risperiDONE (RisperDAL) 0.5 mg tablet, Take 1 tablet (0.5 mg total) by mouth 2 (two) times a day, Disp: 90 tablet, Rfl: 1    risperiDONE (RisperDAL) 1 mg tablet, Take 1 tablet (1 mg total) by mouth 2 (two) times a day, Disp: 90 tablet, Rfl: 1    methylPREDNISolone 4 MG tablet therapy pack, Use  as directed on package (Patient not taking: Reported on 10/17/2022), Disp: 21 each, Rfl: 0    propranolol (INDERAL) 20 mg tablet, Take 20 mg by mouth 2 (two) times a day (Patient not taking: Reported on 10/17/2022), Disp: , Rfl:     Current Allergies     Allergies as of 04/16/2024 - Reviewed 04/16/2024   Allergen Reaction Noted    Sulfa antibiotics Other (See Comments) 06/14/2021            The following portions of the patient's history were reviewed and updated as appropriate: allergies, current medications, past family history, past medical history, past social history, past surgical history and problem list.     Past Medical History:   Diagnosis Date    Autism     Autism     IBS (irritable colon syndrome)     PCOS (polycystic ovarian syndrome)        Past Surgical History:   Procedure Laterality Date    TYMPANOSTOMY TUBE PLACEMENT         Family History   Problem Relation Age of Onset    Prostate cancer Maternal Grandfather          Medications have been verified.        Objective   Pulse (!) 111   Temp 99.3 °F (37.4 °C)   Resp 20   Wt 107 kg (235 lb)   SpO2 96%   BMI 40.34 kg/m²   No LMP recorded.       Physical Exam     Physical Exam  Constitutional:       Appearance: Normal appearance.   HENT:      Right Ear: Tympanic membrane, ear canal and external ear normal.      Left Ear: Tympanic membrane, ear canal and external ear normal.      Ears:      Comments: Purulent drainage and squamous debris moderately in both ear canals.  Tms appear within normal limits but only partially visualized  Eyes:      Pupils: Pupils are equal, round, and reactive to light.   Neurological:      Mental Status: She is alert.   Psychiatric:         Mood and Affect: Mood normal.         Behavior: Behavior normal.

## 2024-05-01 ENCOUNTER — VBI (OUTPATIENT)
Dept: ADMINISTRATIVE | Facility: OTHER | Age: 24
End: 2024-05-01

## 2024-05-01 NOTE — TELEPHONE ENCOUNTER
05/01/24 12:59 PM     [employer] SmartForm used to document caregap status.    Selwyn Burnette MA

## 2025-01-15 ENCOUNTER — OFFICE VISIT (OUTPATIENT)
Dept: ENDOCRINOLOGY | Facility: HOSPITAL | Age: 25
End: 2025-01-15
Payer: COMMERCIAL

## 2025-01-15 VITALS
DIASTOLIC BLOOD PRESSURE: 90 MMHG | HEART RATE: 97 BPM | WEIGHT: 232.6 LBS | BODY MASS INDEX: 39.71 KG/M2 | HEIGHT: 64 IN | SYSTOLIC BLOOD PRESSURE: 124 MMHG

## 2025-01-15 DIAGNOSIS — L68.0 HIRSUTISM: ICD-10-CM

## 2025-01-15 DIAGNOSIS — E28.2 PCOS (POLYCYSTIC OVARIAN SYNDROME): Primary | ICD-10-CM

## 2025-01-15 DIAGNOSIS — E66.812 CLASS 2 OBESITY WITHOUT SERIOUS COMORBIDITY WITH BODY MASS INDEX (BMI) OF 39.0 TO 39.9 IN ADULT, UNSPECIFIED OBESITY TYPE: ICD-10-CM

## 2025-01-15 PROCEDURE — 99204 OFFICE O/P NEW MOD 45 MIN: CPT | Performed by: INTERNAL MEDICINE

## 2025-01-15 RX ORDER — RISPERIDONE 2 MG/1
2 TABLET ORAL 2 TIMES DAILY
COMMUNITY

## 2025-01-15 NOTE — PROGRESS NOTES
1/16/2025    Assessment & Plan      Diagnoses and all orders for this visit:    PCOS (polycystic ovarian syndrome)  -     Comprehensive metabolic panel  -     Prolactin  -     Sex Hormone Binding Globulin  -     Testosterone, free, total  -     TSH, 3rd generation  -     Hemoglobin A1C  -     Lipid Panel with Direct LDL reflex  -     17-Hydroxyprogesterone  -     Insulin and C-Peptide, Serum    Class 2 obesity without serious comorbidity with body mass index (BMI) of 39.0 to 39.9 in adult, unspecified obesity type  -     Comprehensive metabolic panel  -     Prolactin  -     Sex Hormone Binding Globulin  -     Testosterone, free, total  -     TSH, 3rd generation  -     Hemoglobin A1C  -     Lipid Panel with Direct LDL reflex  -     17-Hydroxyprogesterone  -     Insulin and C-Peptide, Serum    Hirsutism  -     Comprehensive metabolic panel  -     Prolactin  -     Sex Hormone Binding Globulin  -     Testosterone, free, total  -     TSH, 3rd generation  -     Hemoglobin A1C  -     Lipid Panel with Direct LDL reflex  -     17-Hydroxyprogesterone  -     Insulin and C-Peptide, Serum    Other orders  -     risperiDONE (RisperDAL) 2 mg tablet; Take 2 mg by mouth 2 (two) times a day  -     Multiple Vitamin (MULTIVITAMIN ADULT PO); Take by mouth weekly        Assessment & Plan  1. Polycystic ovary syndrome (PCOS).  She has been experiencing irregular menstrual cycles since the age of 11, with only 3 periods in the past 5 years. She also exhibits hirsutism on the lip, chin, and neck, and has significant purple stretch marks. The potential complications of PCOS, including the increased risk of endometrial cancer due to irregular periods, were discussed. The treatment options for PCOS, including the use of birth control pills or progesterone withdrawal to induce regular periods, were discussed. The importance of maintaining an active lifestyle and limiting carbohydrate intake was emphasized. Fasting blood work will be ordered to  assess male hormone levels, insulin levels, thyroid function, prolactin, and cortisol. Salivary cortisol levels will be measured over 3 separate nights. If the cortisol levels are elevated, further testing will be considered.    2. Autism.  She is currently on risperidone 2.25 mg twice a day. The medication has been effective in managing her symptoms, although it has contributed to weight gain.  It may have increased her prolactin causing absence of menses.  A lactate level will be evaluated.    3. Irritable bowel syndrome (IBS).  Her IBS symptoms have improved significantly, likely due to the management of anxiety with risperidone and previous use of medical marijuana.    She will get blood work performed at her earliest convenience fasting drawn between 7 and 9 AM consisting of a CMP, fasting insulin level, 17 hydroxyprogesterone, lipid panel, hemoglobin A1c, TSH, prolactin, free and total testosterone level, and sex hormone binding globulin.    Follow-up will be determined based on the studies.          CC: PCOS consult    History of Present Illness    HPI: Roro Anaya is a 24-year-old female here for evaluation/consultation regarding the possibility of polycystic ovary syndrome (PCOS). She is accompanied by her parents.    She was diagnosed with PCOS approximately 10 years ago via ultrasound at [facility]. Her menarche occurred at age 8, with regular menstrual cycles for a few years before ceasing around age 11. Over the past 5 years, she has experienced only 3 menstrual cycles. She has not been treated with birth control for PCOS. She has no children and has never had intercourse. She has a scheduled appointment with her primary care physician tomorrow, during which annual blood work will be conducted.    She has a hump on her back, a progressively wrinkling skull, and significant purple stretch marks. She has been referred to an endocrinologist for further evaluation of her PCOS. She has no breast  leakage. She has noticed hair loss and scalp changes, which she attributes to PCOS. Her weight has been increasing since starting risperidone in 2016. She was thin as a young teenager. She maintains a diet high in fruits and vegetables, but also consumes sweets and junk food daily. She engages in daily exercise, including yoga, using a stepper, an exercise bike, and hiking. She has no sensations of feeling extremely hot or cold. When her risperidone dose is low or she is anxious, she will flap her hands, talk rapidly, and sweat profusely. She sleeps well at night, averaging 10 hours, and is active during the day.    She has been diagnosed with autism. She has been on risperidone since 2016, which led to significant weight gain and exacerbated her PCOS symptoms, including facial hair growth on her chin, neck, and lip that began in her teenage years and has progressively worsened. She also developed tolerance to risperidone, resulting in pacing, weight loss, and increased agitation and behaviors, necessitating an increase in risperidone dosage. She is currently on risperidone 2.25 mg twice daily. She takes a multivitamin once weekly due to thalassemia. She has no history of diabetes, but her maternal grandparents developed type 2 diabetes in their elderly years.     She experienced a crisis from 2014 through 2016, during which she was diagnosed with irritable bowel syndrome (IBS) and started on risperidone and medical marijuana. The IBS symptoms improved, likely due to anxiety provoked by autism. She has no significant diarrhea or constipation, vomiting, or abdominal pain. She occasionally experiences pressure in her stomach, which is not a current issue. She has no dry skin, brittle nails, respiratory issues, or chest pain. She had a brief episode of wheezing last winter following a viral infection. She drinks a lot of water habitually and frequently uses the bathroom. Risperidone increases her appetite and slows her  metabolism, causing her to eat every couple of hours. She does not regularly wake up at night to urinate. She does not bruise easily and her wounds heal well. She experiences acne breakouts about once a month and becomes cranky, similar to premenstrual syndrome (PMS), but does not menstruate. She has no difficulty swallowing or food getting stuck in her throat.        Historical Information   Past Medical History:   Diagnosis Date    Autism     History of frequent ear infections     Hypertension     IBS (irritable colon syndrome)     PCOS (polycystic ovarian syndrome)     Thalassemia      Past Surgical History:   Procedure Laterality Date    TYMPANOSTOMY TUBE PLACEMENT Bilateral     age 1 and age 2     Social History   Social History     Substance and Sexual Activity   Alcohol Use Never     Social History     Substance and Sexual Activity   Drug Use Not Currently    Types: Marijuana    Comment: medical marijuana oral in the past     Social History     Tobacco Use   Smoking Status Never   Smokeless Tobacco Never     Family History:   Family History   Problem Relation Age of Onset    Other (dermographia) Mother     Other (Other) Mother     Melanoma Father     No Known Problems Brother     Diabetes type II Maternal Grandmother         history    Dementia Maternal Grandmother     Diabetes type II Maternal Grandfather     Prostate cancer Maternal Grandfather     COPD Maternal Grandfather        Meds/Allergies   Current Outpatient Medications   Medication Sig Dispense Refill    Multiple Vitamin (MULTIVITAMIN ADULT PO) Take by mouth weekly      risperiDONE (RisperDAL) 0.25 mg tablet Take 0.25 mg by mouth 2 (two) times a day      risperiDONE (RisperDAL) 2 mg tablet Take 2 mg by mouth 2 (two) times a day       No current facility-administered medications for this visit.     Allergies   Allergen Reactions    Sulfa Antibiotics Other (See Comments)     Patient does not tolerate       Objective   Vitals: Blood pressure 124/90,  "pulse 97, height 5' 4\" (1.626 m), weight 106 kg (232 lb 9.6 oz).  Invasive Devices       None                   Physical Exam    No lid lag, stare, proptosis, or periorbital edema. No moon facies. Hirsutism on the lip, chin and neck.  Thyroid normal in size. No palpable nodules. No supraclavicular fat pad but does have a buffalo hump. Acanthosis nigricans in the nape of the neck.  Lungs clear to auscultation.  Heart has a regular rate and rhythm. No murmurs.  No hirsutism in the mid chest.  Hirsutism on the mid abdomen around the umbilicus and below the umbilicus. No violaceous striae, but does have a lot of stretch marks.  No tremor of the outstretched hands. Patellar deep tendon reflexes normal. No lower extremity edema.        The history was obtained from the review of the chart and from the family.    Lab Results:      Blood work performed in 2021 showed a normal TSH at 1.5, hemoglobin A1c 5.4%, and CMP demonstrated a glucose of 87 fasting with an ALT of 56 but was otherwise normal.    Lab Results   Component Value Date    CREATININE 0.61 10/12/2021    CREATININE 0.61 06/23/2021    BUN 10 10/12/2021    K 4.6 10/12/2021     10/12/2021    CO2 21 10/12/2021         Lab Results   Component Value Date    ALT 56 (H) 10/12/2021    AST 35 10/12/2021       Lab Results   Component Value Date    TSH 1.500 06/23/2021    FREET4 1.31 06/23/2021             No future appointments.  "

## 2025-02-07 LAB
17OHP SERPL-MCNC: 54 NG/DL
ALBUMIN SERPL-MCNC: 4.4 G/DL (ref 4–5)
ALP SERPL-CCNC: 88 IU/L (ref 44–121)
ALT SERPL-CCNC: 132 IU/L (ref 0–32)
AST SERPL-CCNC: 103 IU/L (ref 0–40)
BILIRUB SERPL-MCNC: 0.7 MG/DL (ref 0–1.2)
BUN SERPL-MCNC: 6 MG/DL (ref 6–20)
BUN/CREAT SERPL: 10 (ref 9–23)
C PEPTIDE SERPL-MCNC: 5.2 NG/ML (ref 1.1–4.4)
CALCIUM SERPL-MCNC: 9.5 MG/DL (ref 8.7–10.2)
CHLORIDE SERPL-SCNC: 99 MMOL/L (ref 96–106)
CHOLEST SERPL-MCNC: 228 MG/DL (ref 100–199)
CO2 SERPL-SCNC: 21 MMOL/L (ref 20–29)
CREAT SERPL-MCNC: 0.58 MG/DL (ref 0.57–1)
EGFR: 128 ML/MIN/1.73
EST. AVERAGE GLUCOSE BLD GHB EST-MCNC: 217 MG/DL
GLOBULIN SER-MCNC: 2.7 G/DL (ref 1.5–4.5)
GLUCOSE SERPL-MCNC: 164 MG/DL (ref 70–99)
HBA1C MFR BLD: 9.2 % (ref 4.8–5.6)
HDLC SERPL-MCNC: 32 MG/DL
INSULIN SERPL-ACNC: 44 UIU/ML (ref 2.6–24.9)
LDLC SERPL CALC-MCNC: 141 MG/DL (ref 0–99)
LDLC/HDLC SERPL: 4.4 RATIO (ref 0–3.2)
POTASSIUM SERPL-SCNC: 4.3 MMOL/L (ref 3.5–5.2)
PROLACTIN SERPL-MCNC: 42.5 NG/ML (ref 4.8–33.4)
PROT SERPL-MCNC: 7.1 G/DL (ref 6–8.5)
SHBG SERPL-SCNC: 14.3 NMOL/L (ref 24.6–122)
SL AMB VLDL CHOLESTEROL CALC: 55 MG/DL (ref 5–40)
SODIUM SERPL-SCNC: 134 MMOL/L (ref 134–144)
TESTOST FREE SERPL-MCNC: 8.3 PG/ML (ref 0–4.2)
TESTOST SERPL-MCNC: 63 NG/DL (ref 13–71)
TRIGL SERPL-MCNC: 300 MG/DL (ref 0–149)
TSH SERPL DL<=0.005 MIU/L-ACNC: 1.71 UIU/ML (ref 0.45–4.5)

## 2025-02-26 ENCOUNTER — RESULTS FOLLOW-UP (OUTPATIENT)
Dept: ENDOCRINOLOGY | Facility: HOSPITAL | Age: 25
End: 2025-02-26

## 2025-04-16 ENCOUNTER — OFFICE VISIT (OUTPATIENT)
Dept: ENDOCRINOLOGY | Facility: HOSPITAL | Age: 25
End: 2025-04-16
Payer: COMMERCIAL

## 2025-04-16 VITALS
BODY MASS INDEX: 39.75 KG/M2 | HEART RATE: 74 BPM | SYSTOLIC BLOOD PRESSURE: 144 MMHG | WEIGHT: 232.8 LBS | HEIGHT: 64 IN | DIASTOLIC BLOOD PRESSURE: 90 MMHG

## 2025-04-16 DIAGNOSIS — E28.2 PCOS (POLYCYSTIC OVARIAN SYNDROME): Primary | ICD-10-CM

## 2025-04-16 DIAGNOSIS — E78.2 MIXED HYPERLIPIDEMIA: ICD-10-CM

## 2025-04-16 DIAGNOSIS — E66.812 CLASS 2 OBESITY WITHOUT SERIOUS COMORBIDITY WITH BODY MASS INDEX (BMI) OF 39.0 TO 39.9 IN ADULT, UNSPECIFIED OBESITY TYPE: ICD-10-CM

## 2025-04-16 DIAGNOSIS — L68.0 HIRSUTISM: ICD-10-CM

## 2025-04-16 DIAGNOSIS — E22.1 HYPERPROLACTINEMIA (HCC): ICD-10-CM

## 2025-04-16 DIAGNOSIS — E11.65 TYPE 2 DIABETES MELLITUS WITH HYPERGLYCEMIA, WITHOUT LONG-TERM CURRENT USE OF INSULIN (HCC): ICD-10-CM

## 2025-04-16 PROBLEM — E11.9 DIABETES MELLITUS, TYPE 2 (HCC): Status: ACTIVE | Noted: 2025-04-16

## 2025-04-16 PROCEDURE — 99215 OFFICE O/P EST HI 40 MIN: CPT | Performed by: INTERNAL MEDICINE

## 2025-04-16 RX ORDER — METFORMIN HYDROCHLORIDE 500 MG/1
500 TABLET, EXTENDED RELEASE ORAL 2 TIMES DAILY WITH MEALS
Qty: 60 TABLET | Refills: 6 | Status: SHIPPED | OUTPATIENT
Start: 2025-04-16

## 2025-04-16 NOTE — ASSESSMENT & PLAN NOTE
Lab Results   Component Value Date    HGBA1C 9.2 (H) 01/31/2025     Hemoglobin A1c is 9.2% demonstrating uncontrolled diabetes.  Her autism does make it difficult to initiate behavioral changes.  Discussing with her mother as she is autistic, we elected to start metformin  mg once daily with food for 2 to 3 weeks and then increase to twice daily if she tolerates it without significant GI side effects.  Her mother is going to work on portion control for carbohydrate, snack foods, and sweets.  Her mother is going to work on increasing her exercise and amount and length of time and to continue trying to maintain regular exercise.  We did discuss other options for treatment including GLP-1 inhibitors, SGLT2 inhibitors, DPP 4 inhibitors, sulfonylureas, and insulin.  Her mother is not interested in GLP-1 inhibitors at this time unless the older GLP-1 inhibitors are used such as Byetta, Victoza, or Trulicity since they have been studied longer than the current ones.    Orders:    metFORMIN (GLUCOPHAGE-XR) 500 mg 24 hr tablet; Take 1 tablet (500 mg total) by mouth 2 (two) times a day with meals    Comprehensive metabolic panel; Future    Hemoglobin A1C; Future

## 2025-04-16 NOTE — ASSESSMENT & PLAN NOTE
Metformin will be initiated.  Hopefully, this will cause the resumption of menses on a semiregular basis.  If not, then progesterone withdrawal could be considered as she did not tolerate oral contraceptives in the past very well.  These treatment options were discussed with her mother.    Orders:    Comprehensive metabolic panel; Future    Hemoglobin A1C; Future

## 2025-04-16 NOTE — PROGRESS NOTES
Name: Roro Anaya      : 2000      MRN: [id number]  Encounter Provider: Sravani García MD  Encounter Date: 2025   Encounter department: [facility]    No chief complaint on file.  :  Assessment & Plan  PCOS (polycystic ovarian syndrome)    Metformin will be initiated.  Hopefully, this will cause the resumption of menses on a semiregular basis.  If not, then progesterone withdrawal could be considered as she did not tolerate oral contraceptives in the past very well.  These treatment options were discussed with her mother.    Orders:    Comprehensive metabolic panel; Future    Hemoglobin A1C; Future    Hirsutism    Orders:    Comprehensive metabolic panel; Future    Hemoglobin A1C; Future    Class 2 obesity without serious comorbidity with body mass index (BMI) of 39.0 to 39.9 in adult, unspecified obesity type    Behavioral changes are needed to try to get her to lose some weight.  Diet and exercise was discussed with her mother.  We will try metformin to improve insulin resistance to see if that helps some weight loss.    Orders:    Comprehensive metabolic panel; Future    Hemoglobin A1C; Future    Type 2 diabetes mellitus with hyperglycemia, without long-term current use of insulin ([facility])    Lab Results   Component Value Date    HGBA1C 9.2 (H) 2025     Hemoglobin A1c is 9.2% demonstrating uncontrolled diabetes.  Her autism does make it difficult to initiate behavioral changes.  Discussing with her mother as she is autistic, we elected to start metformin  mg once daily with food for 2 to 3 weeks and then increase to twice daily if she tolerates it without significant GI side effects.  Her mother is going to work on portion control for carbohydrate, snack foods, and sweets.  Her mother is going to work on increasing her exercise and amount and length of time and to continue trying to maintain regular exercise.  We did discuss other options  for treatment including GLP-1 inhibitors, SGLT2 inhibitors, DPP 4 inhibitors, sulfonylureas, and insulin.  Her mother is not interested in GLP-1 inhibitors at this time unless the older GLP-1 inhibitors are used such as Byetta, Victoza, or Trulicity since they have been studied longer than the current ones.    Orders:    metFORMIN (GLUCOPHAGE-XR) 500 mg 24 hr tablet; Take 1 tablet (500 mg total) by mouth 2 (two) times a day with meals    Comprehensive metabolic panel; Future    Hemoglobin A1C; Future    Hyperprolactinemia (HCC)    She has hyperprolactinemia likely due to risperidone usage.  At this level, the prolactin should not be causing any significant effect on her menses.  Her prolactin levels will be followed over time.       Mixed hyperlipidemia    She has significant hyperlipidemia with hypertriglyceridemia and low HDL.  This is consistent with insulin resistance and the metabolic syndrome along with diabetes.  The elevated triglycerides and low HDL may be partly due to the elevated blood sugars.  I would like to get her blood sugars under control and then we can reevaluate her lipids.  She may need a lipid-lowering agent at some point in the future.       I have asked her to follow-up in 3 to 4 months with preceding hemoglobin A1c and CMP.  We can utilize virtual visits for her given her autism, but I did inform her mother that I do need to see her at least once a year for diabetic foot exam.    I have spent a total time of 40 minutes in caring for this patient on the day of the visit/encounter including Diagnostic results, Prognosis, Risks and benefits of tx options, Instructions for management, Patient and family education, Importance of tx compliance, Risk factor reductions, Impressions, Counseling / Coordination of care, Documenting in the medical record, Reviewing/placing orders in the medical record (including tests, medications, and/or procedures), and Obtaining or reviewing history  .      History  of Present Illness   History of Present Illness  Roro Anaya is a 25-year-old female with a history of probable polycystic ovary syndrome (PCOS), here for an evaluation in January 2025. She was diagnosed with PCOS 10 years ago via ultrasound at [facility], following the cessation of menses at the age of 11 and a history of irregular periods. She has autism and had been on risperidone, which caused significant weight gain, exacerbating her PCOS symptoms, including facial hair growth.     Had norovirus  in feb 2025.     This visit was to discuss blood work results and treatment options.  These were discussed with her mother since she is autistic.      Pertinent Medical History   Roro Anaya is a 25-year-old female with a history of probable polycystic ovary syndrome (PCOS), here for an evaluation in January 2025. She was diagnosed with PCOS 10 years ago via ultrasound at [facility], following the cessation of menses at the age of 11 and a history of irregular periods. She has autism and had been on risperidone, which caused significant weight gain, exacerbating her PCOS symptoms, including facial hair growth.         Review of Systems as per HPI  Medical History Reviewed by provider this encounter:     .  Current Outpatient Medications on File Prior to Visit   Medication Sig Dispense Refill    risperiDONE (RisperDAL) 0.25 mg tablet Take 0.25 mg by mouth 2 (two) times a day      risperiDONE (RisperDAL) 2 mg tablet Take 2 mg by mouth 2 (two) times a day      Multiple Vitamin (MULTIVITAMIN ADULT PO) Take by mouth weekly (Patient not taking: Reported on 4/16/2025)       No current facility-administered medications on file prior to visit.      Social History     Tobacco Use    Smoking status: Never    Smokeless tobacco: Never   Vaping Use    Vaping status: Never Used   Substance and Sexual Activity    Alcohol use: Never    Drug use: Not Currently     Types: Marijuana     Comment: medical marijuana  "oral in the past    Sexual activity: Never        Medical History Reviewed by provider this encounter:     .    Objective   /90   Pulse 74   Ht 5' 4\" (1.626 m)   Wt 106 kg (232 lb 12.8 oz)   BMI 39.96 kg/m²      Body mass index is 39.96 kg/m².  Wt Readings from Last 3 Encounters:   04/16/25 106 kg (232 lb 12.8 oz)   01/15/25 106 kg (232 lb 9.6 oz)   04/16/24 107 kg (235 lb)     Physical Exam  Physical Exam  The patient is cooperative with the exam.  Thyroid is normal in size. No palpable thyroid nodules.  Lungs are clear to auscultation.  Heart has a regular rate and rhythm. No murmurs.  Hirsutism continues on the chin.  No lower extremity edema.    Results    Labs:   Lab Results   Component Value Date    HGBA1C 9.2 (H) 01/31/2025    HGBA1C 5.4 10/12/2021     Blood work performed on 1/31/2025 showed a CMP with a glucose of 164 fasting, , , but was otherwise normal.  Fasting insulin is 44 with a C-peptide level of 5.2.    17 hydroxyprogesterone is 54.    TSH is 1.71.    Total testosterone is 63 with a free testosterone of 8.3 and a sex hormone binding globulin of 14.3.    Prolactin is 42.5.    Lab Results   Component Value Date    CREATININE 0.58 01/31/2025    CREATININE 0.61 10/12/2021    CREATININE 0.61 06/23/2021    BUN 6 01/31/2025    K 4.3 01/31/2025    CL 99 01/31/2025    CO2 21 01/31/2025     eGFR   Date Value Ref Range Status   01/31/2025 128 >59 mL/min/1.73 Final       Total cholesterol 228, LDL cholesterol 141.    Lab Results   Component Value Date    HDL 32 (L) 01/31/2025    TRIG 300 (H) 01/31/2025     Lab Results   Component Value Date     (H) 01/31/2025     (H) 01/31/2025       Lab Results   Component Value Date    FREET4 1.31 06/23/2021       Patient Instructions   Hgba1c is 9.2%. this is uncontrolled diabetes.     We'll start metformin  mg once daily with food for 2-3 weeks and then if tolerating, increase to twice a day.     Work on portion control for " carbohydrates, snack foods, sweets.    Continue to work on regular exercise and see if the amount/time length can increase.      Follow up in 3-4 months with blood work, can be virtual.     Discussed with the patient and all questioned fully answered. She will call me if any problems arise.

## 2025-04-16 NOTE — ASSESSMENT & PLAN NOTE
Behavioral changes are needed to try to get her to lose some weight.  Diet and exercise was discussed with her mother.  We will try metformin to improve insulin resistance to see if that helps some weight loss.    Orders:    Comprehensive metabolic panel; Future    Hemoglobin A1C; Future

## 2025-04-16 NOTE — PATIENT INSTRUCTIONS
Hgba1c is 9.2%. this is uncontrolled diabetes.     We'll start metformin  mg once daily with food for 2-3 weeks and then if tolerating, increase to twice a day.     Work on portion control for carbohydrates, snack foods, sweets.    Continue to work on regular exercise and see if the amount/time length can increase.      Follow up in 3-4 months with blood work, can be virtual.

## 2025-04-17 NOTE — ASSESSMENT & PLAN NOTE
She has significant hyperlipidemia with hypertriglyceridemia and low HDL.  This is consistent with insulin resistance and the metabolic syndrome along with diabetes.  The elevated triglycerides and low HDL may be partly due to the elevated blood sugars.  I would like to get her blood sugars under control and then we can reevaluate her lipids.  She may need a lipid-lowering agent at some point in the future.

## 2025-04-17 NOTE — ASSESSMENT & PLAN NOTE
She has hyperprolactinemia likely due to risperidone usage.  At this level, the prolactin should not be causing any significant effect on her menses.  Her prolactin levels will be followed over time.

## 2025-04-21 ENCOUNTER — OFFICE VISIT (OUTPATIENT)
Dept: URGENT CARE | Facility: CLINIC | Age: 25
End: 2025-04-21
Payer: COMMERCIAL

## 2025-04-21 VITALS
WEIGHT: 236 LBS | RESPIRATION RATE: 16 BRPM | HEART RATE: 102 BPM | SYSTOLIC BLOOD PRESSURE: 124 MMHG | OXYGEN SATURATION: 97 % | BODY MASS INDEX: 40.51 KG/M2 | TEMPERATURE: 97 F | DIASTOLIC BLOOD PRESSURE: 78 MMHG

## 2025-04-21 DIAGNOSIS — H66.91 RIGHT OTITIS MEDIA, UNSPECIFIED OTITIS MEDIA TYPE: ICD-10-CM

## 2025-04-21 DIAGNOSIS — J01.00 ACUTE MAXILLARY SINUSITIS, RECURRENCE NOT SPECIFIED: Primary | ICD-10-CM

## 2025-04-21 DIAGNOSIS — H60.331 ACUTE SWIMMER'S EAR OF RIGHT SIDE: ICD-10-CM

## 2025-04-21 PROCEDURE — 99213 OFFICE O/P EST LOW 20 MIN: CPT | Performed by: PHYSICIAN ASSISTANT

## 2025-04-21 RX ORDER — HYDROCORTISONE AND ACETIC ACID 20.75; 10.375 MG/ML; MG/ML
SOLUTION AURICULAR (OTIC)
COMMUNITY
Start: 2025-04-08

## 2025-04-21 RX ORDER — OFLOXACIN 3 MG/ML
10 SOLUTION AURICULAR (OTIC) 2 TIMES DAILY
Qty: 5 ML | Refills: 0 | Status: SHIPPED | OUTPATIENT
Start: 2025-04-21

## 2025-04-21 NOTE — PROGRESS NOTES
[facility]        NAME: Roro Anaya is a 25 y.o. female  : 2000    MRN: [id number]  DATE: 2025  TIME: 7:42 PM    /78   Pulse 102   Temp (!) 97 °F (36.1 °C)   Resp 16   Wt 107 kg (236 lb)   SpO2 97%   BMI 40.51 kg/m²     Assessment and Plan   Acute maxillary sinusitis, recurrence not specified [J01.00]  1. Acute maxillary sinusitis, recurrence not specified  amoxicillin-clavulanate (AUGMENTIN) 875-125 mg per tablet    ofloxacin (FLOXIN) 0.3 % otic solution      2. Right otitis media, unspecified otitis media type  amoxicillin-clavulanate (AUGMENTIN) 875-125 mg per tablet    ofloxacin (FLOXIN) 0.3 % otic solution      3. Acute swimmer's ear of right side  amoxicillin-clavulanate (AUGMENTIN) 875-125 mg per tablet    ofloxacin (FLOXIN) 0.3 % otic solution            Patient Instructions       Follow up with PCP in 3-5 days.  Proceed to  ER if symptoms worsen.    Chief Complaint     Chief Complaint   Patient presents with    Earache     Pt's mother reports the ENT gave her ear cream 1 month ago. Mom reports she was cranky over the weekend. Requesting ears be checked.          History of Present Illness       Pt with right eat pain for several days     Earache         Review of Systems   Review of Systems   Constitutional: Negative.    HENT:  Positive for ear pain.    Eyes: Negative.    Respiratory: Negative.     Cardiovascular: Negative.    Gastrointestinal: Negative.    Endocrine: Negative.    Genitourinary: Negative.    Musculoskeletal: Negative.    Skin: Negative.    Allergic/Immunologic: Negative.    Neurological: Negative.    Hematological: Negative.    Psychiatric/Behavioral: Negative.     All other systems reviewed and are negative.        Current Medications       Current Outpatient Medications:     amoxicillin-clavulanate (AUGMENTIN) 875-125 mg per tablet, Take 1 tablet by mouth every 12 (twelve) hours for 10 days, Disp: 20 tablet, Rfl: 0    hydrocortisone-acetic acid  (VOSOL-HC) otic solution, 4 DROPS IN AFFECTED EAR THREE TIMES A DAY X 7 DAYS, Disp: , Rfl:     ofloxacin (FLOXIN) 0.3 % otic solution, Administer 10 drops to the right ear 2 (two) times a day, Disp: 5 mL, Rfl: 0    metFORMIN (GLUCOPHAGE-XR) 500 mg 24 hr tablet, Take 1 tablet (500 mg total) by mouth 2 (two) times a day with meals (Patient not taking: Reported on 4/21/2025), Disp: 60 tablet, Rfl: 6    Multiple Vitamin (MULTIVITAMIN ADULT PO), Take by mouth weekly (Patient not taking: Reported on 4/16/2025), Disp: , Rfl:     risperiDONE (RisperDAL) 0.25 mg tablet, Take 0.25 mg by mouth 2 (two) times a day, Disp: , Rfl:     risperiDONE (RisperDAL) 2 mg tablet, Take 2 mg by mouth 2 (two) times a day, Disp: , Rfl:     Current Allergies     Allergies as of 04/21/2025 - Reviewed 04/21/2025   Allergen Reaction Noted    Sulfa antibiotics Other (See Comments) 06/14/2021            The following portions of the patient's history were reviewed and updated as appropriate: allergies, current medications, past family history, past medical history, past social history, past surgical history and problem list.     Past Medical History:   Diagnosis Date    Autism     History of frequent ear infections     Hypertension     IBS (irritable colon syndrome)     PCOS (polycystic ovarian syndrome)     Thalassemia        Past Surgical History:   Procedure Laterality Date    TYMPANOSTOMY TUBE PLACEMENT Bilateral     age 1 and age 2       Family History   Problem Relation Age of Onset    Other (dermographia) Mother     Other (Other) Mother     Melanoma Father     No Known Problems Brother     Diabetes type II Maternal Grandmother         history    Dementia Maternal Grandmother     Diabetes type II Maternal Grandfather     Prostate cancer Maternal Grandfather     COPD Maternal Grandfather          Medications have been verified.        Objective   /78   Pulse 102   Temp (!) 97 °F (36.1 °C)   Resp 16   Wt 107 kg (236 lb)   SpO2 97%    BMI 40.51 kg/m²        Physical Exam     Physical Exam  Vitals and nursing note reviewed.   Constitutional:       Appearance: Normal appearance. She is normal weight.   HENT:      Head: Normocephalic and atraumatic.      Right Ear: External ear normal.      Left Ear: Tympanic membrane, ear canal and external ear normal.      Ears:      Comments: Right tm erythema and ear canal swelling and redness  No mastoid tenderness      Nose: Congestion and rhinorrhea present.      Comments: + yellow post nasal drip      Mouth/Throat:      Mouth: Mucous membranes are moist.      Pharynx: Oropharynx is clear.   Eyes:      Extraocular Movements: Extraocular movements intact.      Conjunctiva/sclera: Conjunctivae normal.      Pupils: Pupils are equal, round, and reactive to light.   Cardiovascular:      Rate and Rhythm: Normal rate and regular rhythm.      Pulses: Normal pulses.      Heart sounds: Normal heart sounds.   Pulmonary:      Effort: Pulmonary effort is normal.      Breath sounds: Normal breath sounds.   Abdominal:      General: Bowel sounds are normal.      Palpations: Abdomen is soft.   Musculoskeletal:         General: Normal range of motion.      Cervical back: Normal range of motion and neck supple.   Skin:     General: Skin is warm.      Capillary Refill: Capillary refill takes less than 2 seconds.   Neurological:      Mental Status: She is alert and oriented to person, place, and time.   Psychiatric:         Behavior: Behavior normal.

## 2025-04-29 ENCOUNTER — TELEPHONE (OUTPATIENT)
Age: 25
End: 2025-04-29

## 2025-04-29 NOTE — TELEPHONE ENCOUNTER
Patient's mom called to schedule the patient's follow up but virtually. She said Dr. García said it was ok to be virtual.    Please change to virtual.

## 2025-04-30 NOTE — TELEPHONE ENCOUNTER
Left message for brandie Chau to call back.  Will need to reschedule patient's August appointment to be the last appointment of the day.  Dr García  required virtual appointments to be at the end of the day.

## 2025-05-08 DIAGNOSIS — E11.65 TYPE 2 DIABETES MELLITUS WITH HYPERGLYCEMIA, WITHOUT LONG-TERM CURRENT USE OF INSULIN (HCC): ICD-10-CM

## 2025-05-09 RX ORDER — METFORMIN HYDROCHLORIDE 500 MG/1
500 TABLET, EXTENDED RELEASE ORAL 2 TIMES DAILY WITH MEALS
Qty: 180 TABLET | Refills: 1 | Status: SHIPPED | OUTPATIENT
Start: 2025-05-09